# Patient Record
Sex: MALE | Race: BLACK OR AFRICAN AMERICAN | URBAN - METROPOLITAN AREA
[De-identification: names, ages, dates, MRNs, and addresses within clinical notes are randomized per-mention and may not be internally consistent; named-entity substitution may affect disease eponyms.]

---

## 2019-06-13 ENCOUNTER — HOSPITAL ENCOUNTER (OUTPATIENT)
Dept: PHYSICAL THERAPY | Age: 19
Discharge: HOME | End: 2019-06-13
Payer: COMMERCIAL

## 2019-06-13 PROCEDURE — 97161 PT EVAL LOW COMPLEX 20 MIN: CPT

## 2019-06-13 PROCEDURE — 97016 VASOPNEUMATIC DEVICE THERAPY: CPT

## 2019-06-13 PROCEDURE — 97110 THERAPEUTIC EXERCISES: CPT

## 2019-06-13 NOTE — PROGRESS NOTES
PT DAILY TREATMENT NOTE/KNEE EVAL 10-18    Patient Name: Salma Faulkner  Date:2019  : 2000  [x]  Patient  Verified  Payor: BLUE GABY / Plan: Shreya Claros [id number] PPO / Product Type: PPO /    In time:12:35  Out time:1:30  Total Treatment Time (min): 55  Visit #: 1 of 20    Medicare/BCBS Only   Total Timed Codes (min):  55 1:1 Treatment Time:  55     Treatment Area: Right knee pain [M25.561]    SUBJECTIVE  Pain Level (0-10 scale): 3  []constant [x]intermittent []improving []worsening []no change since onset    Any medication changes, allergies to medications, adverse drug reactions, diagnosis change, or new procedure performed?: [x] No    [] Yes (see summary sheet for update)  Subjective functional status/changes:     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 MRI resutls uncertain whether full tear  Current symptoms/Complaints: 4-6 weeks f/u. Pain increase to 9/10 with walking, bending. Achy pain in back of knee and anteriorly. Previous Treatment/Compliance: None  PMHx/Surgical Hx: left knee PCL injury 6 years ago from helmet, asthma   Work Hx: senior year at [employer],   Living Situation: 1 story, 2 GREGG  Pt Goals: \"not to have surgery. \"    OBJECTIVE/EXAMINATION      Modality rationale: decrease edema, decrease inflammation and decrease pain to improve the patients ability to perform daily activities with decreased pain and symptom levels   Min Type Additional Details    [] Estim:  []Unatt       []IFC  []Premod                        []Other:  []w/ice   []w/heat  Position:  Location:    [] Estim: []Att    []TENS instruct  []NMES                    []Other:  []w/US   []w/ice   []w/heat  Position:  Location:    []  Traction: [] Cervical       []Lumbar                       [] Prone          []Supine                       []Intermittent []Continuous Lbs:  [] before manual  [] after manual    []  Ultrasound: []Continuous   [] Pulsed                           []1MHz   []3MHz Location:  W/cm2:    []  Iontophoresis with dexamethasone         Location: [] Take home patch   [] In clinic    []  Ice     []  heat  []  Ice massage  []  Laser   []  Anodyne Position:  Location:    []  Laser with stim  []  Other: Position:  Location:   10 [x]  Vasopneumatic Device Pressure:       [] lo [x] med [] hi   Temperature: [x] lo [] med [] hi   [x] Skin assessment post-treatment:  [x]intact []redness- no adverse reaction    []redness  adverse reaction:     35 min [x]Eval                  []Re-Eval       10 min Therapeutic Exercise:  [x] See flow sheet :   Rationale: increase ROM and increase strength to improve the patients ability to perform daily activities with decreased pain and symptom levels          With   [] TE   [] TA   [] neuro   [] other: Patient Education: [x] Review HEP    [] Progressed/Changed HEP based on:   [] positioning   [] body mechanics   [] transfers   [] heat/ice application    [] other:      Other Objective/Functional Measures: see initial eval     Physical Therapy Evaluation - Knee    Posture: [] Varus    [] Valgus    [] Recurvatum        [] Tibial Torsion    [] Foot Supination    [] Foot Pronation    Describe:    Gait:  [] Normal    [] Abnormal    [x] Antalgic    [] NWB    Device:    Describe:    ROM / Strength  [] Unable to assess                  AROM                      PROM                   Strength (1-5)    Left Right Left Right Left Right   Hip Flexion     5 5    Extension     4 4    Abduction     4- 4-    Adduction         Knee Flexion 135* 130*   5 4    Extension 2* hyper  2* hyper    5 5 , good quad set   Ankle Plantarflexion     30 SL HR 30 SL HR     Dorsiflexion     5 5       Flexibility: [] Unable to assess at this time  Hamstrings:    (L) Tightness= [] WNL   [x] Min   [] Mod   [] Severe    (R) Tightness= [] WNL   [x] Min   [] Mod   [] Severe  Quadriceps:    (L) Tightness= [] WNL   [x] Min   [] Mod   [] Severe    (R) Tightness= [] WNL   [x] Min   [] Mod   [] Severe  Gastroc:      (L) Tightness= [] WNL   [] Min   [] Mod   [] Severe    (R) Tightness= [] WNL   [] Min   [] Mod   [] Severe  Other:    Palpation:   Neg/Pos  Neg/Pos  Neg/Pos   Joint Line  Quad tendon  Patellar ligament    Patella  Fibular head  Pes Anserinus    Tibial tubercle  Hamstring tendons  Infrapatellar fat pad      Optional Tests:  Patellar Positioning (Static)   []L []R Normal []L []R Lateral   []L []R Nancy Mahendra      []L []R Medial   []L []R Baja    Patellar Tracking   []L []R Glide (Lat)   []L []R Tilt (Lat)     []L []R Glide (Med)  []L []R Tilt (Med)      []L []R Tile (Inf)     Patellar Mobility   []L [x]R Hypermobile []L []R Hypomobile         Girth Measurements:     Cm at  Cm above joint line   Cm at   Cm below joint line  Cm at joint line   Left     37   Right      38     Lachmans  [x] Neg    [] Pos Posterior Drawer [] Neg    [x] Pos  Pivot Shift  [] Neg    [] Pos Posterior Sag  [] Neg    [x] Pos  FELICITAS   [] Neg    [] Pos Genie's Test [] Neg    [] Pos  ALRI   [] Neg    [] Pos Squat   [] Neg    [x] Pos  Valgus@ 0 Degrees [x] Neg    [] Pos Cesar [] Neg    [] Pos  Valgus@ 30 Degrees [x] Neg    [] Pos Patellar Apprehension [] Neg    [] Pos  Varus@ 0 Degrees [x] Neg    [] Pos Fagan's Compression [] Neg    [] Pos  Varus@ 30 Degrees [x] Neg    [] Pos Ely's Test  [] Neg    [] Pos  Apley's Compression [] Neg    [] Pos Trisha's Test  [] Neg    [] Pos  Apley's Distraction [] Neg    [] Pos Stroke Test  [] Neg    [] Pos   Anterior Drawer [x] Neg    [] Pos Fluctuation Test [] Neg    [] Pos  Other:                  [] Neg    [] Pos                 Other tests/comments:  Increased instability with right LE SL balance  Increased bilateral laxity with posterior drawer right > left       Pain Level (0-10 scale) post treatment: 0    ASSESSMENT/Changes in Function: 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 Pt reprorting MRI inconclusive on whether full tear of PCL. Pt demonstrates WFL knee ROM, decreased HS, glut and quad strength, poor squat form with increased lumbar paraspinal use and decreased WB on right LE, instability with SL balance on right, and increased laxity with posterior drawer and positive posterior sag. Signs and symptoms consistent with PCL tear. Pt would benefit from skilled PT services to address the above impairments to allow pt to return to playing football without right knee pain. Patient will continue to benefit from skilled PT services to modify and progress therapeutic interventions, address functional mobility deficits, address ROM deficits, address strength deficits, analyze and address soft tissue restrictions, analyze and cue movement patterns, analyze and modify body mechanics/ergonomics, assess and modify postural abnormalities, address imbalance/dizziness and instruct in home and community integration to attain remaining goals. []  See Plan of Care  []  See progress note/recertification  []  See Discharge Summary         Progress towards goals / Updated goals:  Short Term Goals: STG- To be accomplished in 2 week(s):  1. Pt will be independent with HEP to encourage prophylaxis. Eval:HEP dispensed     Long Term Goals: LTG- To be accomplished in 10 week(s):  1. Pt will demonstrate ability to run and participate in football training without knee pain to demonstrate improved functional LE strength. Eval:jogging with minimal pain, unable to run, jump, squat without pain     2. Pt will be able to complete SL squat to parallel with good form and no pain to demonstrate improved functional glut strength.    Eval:DL squat with increased lumbar paraspinal use, decreased WB on right LE, unable to reach parallel without pain    3. Pt right knee AROM flexion will equal that of left without pain to increase ease with stairs  Eval:right 130*, left 135* pain at end range       4. Pt FOTO score will increase by >/=22 points to show improvement in subjective function.   Eval:51  Current: will address at visit 5      PLAN  []  Upgrade activities as tolerated     [x]  Continue plan of care  []  Update interventions per flow sheet       []  Discharge due to:_  []  Other:_      Analilia Carbajal 6/13/2019  12:36 PM

## 2019-06-13 NOTE — PROGRESS NOTES
[facility] at the [address], [place], [address] [facility]  Phone: [phone]      Fax:  [phone]       Plan of Care/ Statement of Necessity for Physical Therapy Services      Patient name: Conrad Bush Start of Care: 2019   Referral source: Savita Boateng MD : 2000    Medical Diagnosis: Right knee pain [M25.561]   Onset Date: 2019    Treatment Diagnosis: right knee pain   Prior Hospitalization: see medical history Provider#: [id number]   Medications: Verified on Patient summary List    Comorbidities: left PCL tear 6 years ago, asthma   Prior Level of Function: football player for       The Plan of Care and following information is based on the information from the initial evaluation. 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 Pt reprorting MRI inconclusive on whether full tear of PCL. Pt demonstrates WFL knee ROM however pain with end range flexion, decreased HS, glut and quad strength, poor squat form with increased lumbar paraspinal use and decreased WB on right LE, instability with SL balance on right, and increased laxity with posterior drawer and positive posterior sag. Signs and symptoms consistent with PCL tear. Pt would benefit from skilled PT services to address the above impairments to allow pt to return to playing football without right knee pain.        Evaluation Complexity History MEDIUM  Complexity : 1-2 comorbidities / personal factors will impact the outcome/ POC ; Examination MEDIUM Complexity : 3 Standardized tests and measures addressing body structure, function, activity limitation and / or participation in recreation  ;Presentation LOW Complexity : Stable, uncomplicated  ;Clinical Decision Making MEDIUM Complexity : FOTO score of 26-74  Overall Complexity Rating: LOW   Problem List: pain affecting function, decrease ROM, decrease strength, edema affecting function, impaired gait/ balance, decrease ADL/ functional abilitiies, decrease activity tolerance, decrease flexibility/ joint mobility and decrease transfer abilities   Treatment Plan may include any combination of the following: Therapeutic exercise, Therapeutic activities, Neuromuscular re-education, Physical agent/modality, Gait/balance training, Manual therapy, Patient education, Self Care training, Functional mobility training and Home safety training  Patient / Family readiness to learn indicated by: asking questions, trying to perform skills and interest  Persons(s) to be included in education: patient (P)  Barriers to Learning/Limitations: None  Patient Goal (s): not to have surgery. \"  Patient Self Reported Health Status: excellent  Rehabilitation Potential: excellent    Short Term Goals: STG- To be accomplished in 2 week(s):  1. Pt will be independent with HEP to encourage prophylaxis. Eval:HEP dispensed      Long Term Goals: LTG- To be accomplished in 10 week(s):  1. Pt will demonstrate ability to run and participate in football training without knee pain to demonstrate improved functional LE strength. Eval:jogging with minimal pain, unable to run, jump, squat without pain      2. Pt will be able to complete SL squat to parallel with good form and no pain to demonstrate improved functional glut strength. Eval:DL squat with increased lumbar paraspinal use, decreased WB on right LE, unable to reach parallel without pain     3. Pt right knee AROM flexion will equal that of left without pain to increase ease with stairs  Eval:right 130*, left 135* pain at end range         4. Pt FOTO score will increase by >/=22 points to show improvement in subjective function. Eval:51  Current: will address at visit 5           Frequency / Duration: Patient to be seen 2 times per week for 10 weeks.     Patient/ Caregiver education and instruction: Diagnosis, prognosis, self care, activity modification and exercises   [x]  Plan of care has been reviewed with JI IRELAND Remesic 6/13/2019 3:07 PM  _____________________________________________________________________  I certify that the above Therapy Services are being furnished while the patient is under my care. I agree with the treatment plan and certify that this therapy is necessary.     Physician's Signature:____________Date:_________TIME:________    [employer], Date and Time must be completed for valid certification **    Please sign and return to [facility] at the [address], [place], [address] [facility]       Phone: [phone]      Fax:  [phone]

## 2019-06-25 ENCOUNTER — HOSPITAL ENCOUNTER (OUTPATIENT)
Dept: PHYSICAL THERAPY | Age: 19
Discharge: HOME | End: 2019-06-25
Payer: COMMERCIAL

## 2019-06-25 PROCEDURE — 97016 VASOPNEUMATIC DEVICE THERAPY: CPT

## 2019-06-25 PROCEDURE — 97110 THERAPEUTIC EXERCISES: CPT

## 2019-06-25 NOTE — PROGRESS NOTES
PT DAILY TREATMENT NOTE    Patient Name: America Fontana  Date:2019  : 2000  [x]  Patient  Verified  Payor: BLUE CROSS / Plan: Shreya Claros [id number] PPO / Product Type: PPO /    In time:12:20  Out time:1:27  Total Treatment Time (min): 67  Total Timed Codes (min): 57  1:1 Treatment Time (MC only): 50   Visit #: 2 of 20    Treatment Area: Right knee pain [M25.561]    SUBJECTIVE  Pain Level (0-10 scale): 0  Any medication changes, allergies to medications, adverse drug reactions, diagnosis change, or new procedure performed?: [x] No    [] Yes (see summary sheet for update)  Subjective functional status/changes:   [] No changes reported  \"Its fine. \"     OBJECTIVE    Modalities Rationale:     decrease inflammation, decrease pain and increase tissue extensibility to improve patient's ability to perform pain free ADL's    min [] Estim, type/location:                                      []  att     []  unatt     []  w/US     []  w/ice    []  w/heat    min []  Mechanical Traction: type/lbs                   []  pro   []  sup   []  int   []  cont    []  before manual    []  after manual    min []  Ultrasound, settings/location:      min []  Iontophoresis w/ dexamethasone, location:                                               []  take home patch       []  in clinic    min []  Ice     []  Heat    location/position:    10 min [x]  Vasopneumatic Device, press/temp:  To right knee in supine with elevation     min []  Other:    [x] Skin assessment post-treatment (if applicable):    []  intact    []  redness- no adverse reaction     []redness  adverse reaction:          57 min Therapeutic Exercise:  [x] See flow sheet :   Rationale: increase ROM, increase strength, improve coordination and improve balance to improve the patients ability to perform pain free ADL's     With   [x] TE   [] TA   [] neuro   [] other: Patient Education: [x] Review HEP    [] Progressed/Changed HEP based on:   [] positioning [] body mechanics   [] transfers   [] heat/ice application    [] other:      Other Objective/Functional Measures:   Good performance of therex   Challenged with balance on foam  Patella femoral pain with step ups on level 4, decreased pain with level 3      Pain Level (0-10 scale) post treatment: 0    ASSESSMENT/Changes in Function: Pt reported no pain  At start of treatment. Tolerated all therex very well. Slight challenge with balance on foam. Reported most pain with squatting and kneeling. Challenged with stool scoots and 90/90     Patient will continue to benefit from skilled PT services to modify and progress therapeutic interventions, address functional mobility deficits, address ROM deficits, address strength deficits, analyze and address soft tissue restrictions, analyze and cue movement patterns, analyze and modify body mechanics/ergonomics, assess and modify postural abnormalities, address imbalance/dizziness and instruct in home and community integration to attain remaining goals. []  See Plan of Care  []  See progress note/recertification  []  See Discharge Summary         Progress towards goals / Updated goals:  Short Term Goals: STG- To be accomplished in 2 week(s):  1. Pt will be independent with HEP to encourage prophylaxis. Eval:HEP dispensed      Long Term Goals: LTG- To be accomplished in 10 week(s):  1. Pt will demonstrate ability to run and participate in football training without knee pain to demonstrate improved functional LE strength. Eval:jogging with minimal pain, unable to run, jump, squat without pain      2. Pt will be able to complete SL squat to parallel with good form and no pain to demonstrate improved functional glut strength. Eval:DL squat with increased lumbar paraspinal use, decreased WB on right LE, unable to reach parallel without pain     3.   Pt right knee AROM flexion will equal that of left without pain to increase ease with stairs  Eval:right 130*, left 135* pain at end range   Current: NA      4. Pt FOTO score will increase by >/=22 points to show improvement in subjective function. Eval:51  Current: will address at visit 5           PLAN  [x]  Upgrade activities as tolerated     [x]  Continue plan of care  []  Update interventions per flow sheet       []  Discharge due to:_  []  Other:_      Minus Sonali, PTA 6/25/2019  12:26 PM    No future appointments.

## 2019-06-26 ENCOUNTER — APPOINTMENT (OUTPATIENT)
Dept: PHYSICAL THERAPY | Age: 19
End: 2019-06-26
Payer: COMMERCIAL

## 2019-07-01 ENCOUNTER — HOSPITAL ENCOUNTER (OUTPATIENT)
Dept: PHYSICAL THERAPY | Age: 19
Discharge: HOME | End: 2019-07-01
Payer: COMMERCIAL

## 2019-07-01 PROCEDURE — 97110 THERAPEUTIC EXERCISES: CPT

## 2019-07-01 PROCEDURE — 97016 VASOPNEUMATIC DEVICE THERAPY: CPT

## 2019-07-01 NOTE — PROGRESS NOTES
PT DAILY TREATMENT NOTE    Patient Name: Stephan Jason  Date:2019  : 2000  [x]  Patient  Verified  Payor: BLUE GABY / Plan: Shreya Claros [id number] PPO / Product Type: PPO /    In time:3:15  Out time:4:20  Total Treatment Time (min): 65  Total Timed Codes (min): 55  1:1 Treatment Time (MC only): 54   Visit #: 3 of 20    Treatment Area: Right knee pain [M25.561]    SUBJECTIVE  Pain Level (0-10 scale): 0  Any medication changes, allergies to medications, adverse drug reactions, diagnosis change, or new procedure performed?: [x] No    [] Yes (see summary sheet for update)  Subjective functional status/changes:   [] No changes reported  \"good. \"     OBJECTIVE    Modalities Rationale:     decrease pain to improve patient's ability to perform pain free ADL's    min [] Estim, type/location:                                      []  att     []  unatt     []  w/US     []  w/ice    []  w/heat    min []  Mechanical Traction: type/lbs                   []  pro   []  sup   []  int   []  cont    []  before manual    []  after manual    min []  Ultrasound, settings/location:      min []  Iontophoresis w/ dexamethasone, location:                                               []  take home patch       []  in clinic    min []  Ice     []  Heat    location/position:    10 min [x]  Vasopneumatic Device, press/temp:  To right knee in supine     min []  Other:    [] Skin assessment post-treatment (if applicable):    []  intact    []  redness- no adverse reaction     []redness  adverse reaction:          55 min Therapeutic Exercise:  [] See flow sheet :   Rationale: increase ROM, increase strength, improve coordination and improve balance to improve the patients ability to perform pain free ADL's     With   [x] TE   [] TA   [] neuro   [] other: Patient Education: [x] Review HEP    [] Progressed/Changed HEP based on:   [] positioning   [] body mechanics   [] transfers   [] heat/ice application    [] other: Other Objective/Functional Measures:   Pain with heel downs      Pain Level (0-10 scale) post treatment: 0    ASSESSMENT/Changes in Function: Pt tolerated all therex well. reported no pain after last session. Pt reported pain in knee with heel downs. Good response to stool scoots with no pain . Patient will continue to benefit from skilled PT services to modify and progress therapeutic interventions, address functional mobility deficits, address ROM deficits, address strength deficits, analyze and address soft tissue restrictions, analyze and cue movement patterns, analyze and modify body mechanics/ergonomics, assess and modify postural abnormalities, address imbalance/dizziness and instruct in home and community integration to attain remaining goals. []  See Plan of Care  []  See progress note/recertification  []  See Discharge Summary         Progress towards goals / Updated goals:  Short Term Goals: STG- To be accomplished in 2 week(s):  1.  Pt will be independent with HEP to encourage prophylaxis. Eval:HEP dispensed      Long Term Goals: LTG- To be accomplished in 10 week(s):  1.  Pt will demonstrate ability to run and participate in football training without knee pain to demonstrate improved functional LE strength. Eval:jogging with minimal pain, unable to run, jump, squat without pain   Current: not running at this time. Pain with heel down      2.  Pt will be able to complete SL squat to parallel with good form and no pain to demonstrate improved functional glut strength.   Eval:DL squat with increased lumbar paraspinal use, decreased WB on right LE, unable to reach parallel without pain     3.  Pt right knee AROM flexion will equal that of left without pain to increase ease with stairs  Eval:right 130*, left 135* pain at end range   Current: NA      4.  Pt FOTO score will increase by >/=22 points to show improvement in subjective function.   Eval:51  Current: will address at visit 5           PLAN  [x]  Upgrade activities as tolerated     [x]  Continue plan of care  []  Update interventions per flow sheet       []  Discharge due to:_  []  Other:_      Alyssa Pickett PTA 7/1/2019  3:23 PM    Future Appointments   Date Time Provider [place]   7/2/2019 12:15 PM Ulysses Eden PTA [id number]

## 2019-07-02 ENCOUNTER — HOSPITAL ENCOUNTER (OUTPATIENT)
Dept: PHYSICAL THERAPY | Age: 19
Discharge: HOME | End: 2019-07-02
Payer: COMMERCIAL

## 2019-07-02 PROCEDURE — 97110 THERAPEUTIC EXERCISES: CPT

## 2019-07-02 PROCEDURE — 97016 VASOPNEUMATIC DEVICE THERAPY: CPT

## 2019-07-02 NOTE — PROGRESS NOTES
PT DAILY TREATMENT NOTE    Patient Name: Manuel Mcginnis  Date:2019  : 2000  [x]  Patient  Verified  Payor: BLUE CROSS / Plan: Shreya Claros [id number] PPO / Product Type: PPO /    In time:12:25  Out time:1:30  Total Treatment Time (min): 65  Total Timed Codes (min): 55  1:1 Treatment Time (MC only): 55   Visit #: 4 of 20    Treatment Area: Right knee pain [M25.561]    SUBJECTIVE  Pain Level (0-10 scale): 0  Any medication changes, allergies to medications, adverse drug reactions, diagnosis change, or new procedure performed?: [x] No    [] Yes (see summary sheet for update)  Subjective functional status/changes:   [] No changes reported  \"Im a little sore. \"     OBJECTIVE    Modalities Rationale:     decrease inflammation and decrease pain to improve patient's ability to perform pain free ADL's    min [] Estim, type/location:                                      []  att     []  unatt     []  w/US     []  w/ice    []  w/heat    min []  Mechanical Traction: type/lbs                   []  pro   []  sup   []  int   []  cont    []  before manual    []  after manual    min []  Ultrasound, settings/location:      min []  Iontophoresis w/ dexamethasone, location:                                               []  take home patch       []  in clinic    min []  Ice     []  Heat    location/position:    10 min [x]  Vasopneumatic Device, press/temp:  To right knee in supine with elevation     min []  Other:    [x] Skin assessment post-treatment (if applicable):    []  intact    []  redness- no adverse reaction     []redness  adverse reaction:        55 min Therapeutic Exercise:  [] See flow sheet :   Rationale: increase ROM, increase strength, improve coordination and improve balance to improve the patients ability to perform pain free ADL\"s     With   [x] TE   [] TA   [] neuro   [] other: Patient Education: [x] Review HEP    [] Progressed/Changed HEP based on:   [] positioning   [] body mechanics   [] transfers   [] heat/ice application    [] other:      Other Objective/Functional Measures:   Challenged with heel downs   Pain in anterior knee with lateral step ups      Pain Level (0-10 scale) post treatment: 0    ASSESSMENT/Changes in Function: Pt reported good tolerance to all exercises. Mild pain reported in anterior knee with lateral step ups and heel downs. No pain with TKE. Patient will continue to benefit from skilled PT services to modify and progress therapeutic interventions, address functional mobility deficits, address ROM deficits, address strength deficits, analyze and address soft tissue restrictions, analyze and cue movement patterns, analyze and modify body mechanics/ergonomics, assess and modify postural abnormalities, address imbalance/dizziness and instruct in home and community integration to attain remaining goals. []  See Plan of Care  []  See progress note/recertification  []  See Discharge Summary         Progress towards goals / Updated goals:  Short Term Goals: STG- To be accomplished in 2 week(s):  1.  Pt will be independent with HEP to encourage prophylaxis. Eval:HEP dispensed      Long Term Goals: LTG- To be accomplished in 10 week(s):  1.  Pt will demonstrate ability to run and participate in football training without knee pain to demonstrate improved functional LE strength. Eval:jogging with minimal pain, unable to run, jump, squat without pain   Current: not running at this time.  Pain with heel down      2.  Pt will be able to complete SL squat to parallel with good form and no pain to demonstrate improved functional glut strength.   Eval:DL squat with increased lumbar paraspinal use, decreased WB on right LE, unable to reach parallel without pain     3.  Pt right knee AROM flexion will equal that of left without pain to increase ease with stairs  Eval:right 130*, left 135* pain at end range   Current: NA      4.  Pt FOTO score will increase by >/=22 points to show improvement in subjective function.   Eval:51  Current: will address at visit 5              PLAN  [x]  Upgrade activities as tolerated     [x]  Continue plan of care  []  Update interventions per flow sheet       []  Discharge due to:_  []  Other:_      Mela Fleming, PTA 7/2/2019  12:27 PM    Future Appointments   Date Time Provider [place]   7/9/2019  3:00 PM Alexandria Burr, PT [facility]   7/12/2019 10:00 AM RemesicSharee Keyuridias [facility]   7/15/2019  5:30 PM Remesic Veropeterson Kemps [facility]   7/17/2019  3:45 PM Remesic, Veronia Kemps [facility]   7/22/2019  4:00 PM Remesic Veronia Kemps [facility]   7/24/2019  5:15 PM Remesic Veronia Kemps [facility]   7/29/2019  4:45 PM Remesic Veronia Kemps [facility]   7/31/2019  3:45 PM Remesic, Veronia Kemps [facility]

## 2019-07-09 ENCOUNTER — HOSPITAL ENCOUNTER (OUTPATIENT)
Dept: PHYSICAL THERAPY | Age: 19
Discharge: HOME | End: 2019-07-09
Payer: COMMERCIAL

## 2019-07-09 PROCEDURE — 97016 VASOPNEUMATIC DEVICE THERAPY: CPT

## 2019-07-09 PROCEDURE — 97110 THERAPEUTIC EXERCISES: CPT

## 2019-07-09 NOTE — PROGRESS NOTES
PT DAILY TREATMENT NOTE    Patient Name: Inés Santana  Date:2019  : 2000  [x]  Patient  Verified  Payor: BLUE CROSS / Plan: Shreya Claros [id number] PPO / Product Type: PPO /    In time:3:00  Out time:4:10  Total Treatment Time (min): 70  Total Timed Codes (min): 60  1:1 Treatment Time (MC only): 60   Visit #: 5 of 20    Treatment Area: Right knee pain [M25.561]    SUBJECTIVE  Pain Level (0-10 scale): 3  Any medication changes, allergies to medications, adverse drug reactions, diagnosis change, or new procedure performed?: [x] No    [] Yes (see summary sheet for update)  Subjective functional status/changes:   [] No changes reported  \"Im having a little bit of pain in the front of the knee with walking, but that its. \"     OBJECTIVE    Modalities Rationale:     decrease pain to improve patient's ability to perform pain free ADL's    min [] Estim, type/location:                                      []  att     []  unatt     []  w/US     []  w/ice    []  w/heat    min []  Mechanical Traction: type/lbs                   []  pro   []  sup   []  int   []  cont    []  before manual    []  after manual    min []  Ultrasound, settings/location:      min []  Iontophoresis w/ dexamethasone, location:                                               []  take home patch       []  in clinic    min []  Ice     []  Heat    location/position:    10 min [x]  Vasopneumatic Device, press/temp: To left knee in supine .     min []  Other:    [] Skin assessment post-treatment (if applicable):    []  intact    []  redness- no adverse reaction     []redness  adverse reaction:          60 min Therapeutic Exercise:  [x] See flow sheet :   Rationale: increase ROM, increase strength, improve coordination and improve balance to improve the patients ability to perform pain free ADL\"s           With   [x] TE   [] TA   [] neuro   [] other: Patient Education: [x] Review HEP    [] Progressed/Changed HEP based on:   [] positioning   [] body mechanics   [] transfers   [] heat/ice application    [] other:      Other Objective/Functional Measures:   Glut fatigue with hip hikes and hip abd  Pain with heel downs    Knee flexion : NA  Single squat to 24 inch surface with minimal pain (unable to squat lower due to pain)       Pain Level (0-10 scale) post treatment: 0    ASSESSMENT/Changes in Function: Pt reported anterior knee pain with ambulation. Continues to demonstrated weak hamstrings / gluts. Pt reports knee feels the best after stool scoots and hamstring exercise. Patient will continue to benefit from skilled PT services to modify and progress therapeutic interventions, address functional mobility deficits, address ROM deficits, address strength deficits, analyze and address soft tissue restrictions, analyze and cue movement patterns, analyze and modify body mechanics/ergonomics, assess and modify postural abnormalities, address imbalance/dizziness and instruct in home and community integration to attain remaining goals. []  See Plan of Care  []  See progress note/recertification  []  See Discharge Summary         Progress towards goals / Updated goals:  Short Term Goals: STG- To be accomplished in 2 week(s):  1.  Pt will be independent with HEP to encourage prophylaxis. Eval:HEP dispensed  Current: reviewed  And complaint      Long Term Goals: LTG- To be accomplished in 10 week(s):  1.  Pt will demonstrate ability to run and participate in football training without knee pain to demonstrate improved functional LE strength. Eval:jogging with minimal pain, unable to run, jump, squat without pain   Current: not running at this time.  Pain with heel down and single leg squat at session 5      2.  Pt will be able to complete SL squat to parallel with good form and no pain to demonstrate improved functional glut strength.   Eval:DL squat with increased lumbar paraspinal use, decreased WB on right LE, unable to reach parallel without pain  Current: Pain with single leg squat to 24 inch surface      3.  Pt right knee AROM flexion will equal that of left without pain to increase ease with stairs  Eval:right 130*, left 135* pain at end range   Current:  NA      4.  Pt FOTO score will increase by >/=22 points to show improvement in subjective function.   Eval:51  Current: 77                 PLAN  [x]  Upgrade activities as tolerated     [x]  Continue plan of care  []  Update interventions per flow sheet       []  Discharge due to:_  []  Other:_      Kentrell Wade, JI 7/9/2019  3:04 PM    Future Appointments   Date Time Provider [place] Land   7/12/2019 10:00 AM Digna Liu [facility]   7/15/2019  5:30 PM Digna Liu [facility]   7/17/2019  3:45 PM Digna Liu [facility]   7/22/2019  4:00 PM Digna Liu [facility]   7/24/2019  5:15 [facility]   7/29/2019  4:45 PM Digna Liu [facility]   7/31/2019  3:45 PM Digna Liu [facility]

## 2019-07-12 ENCOUNTER — HOSPITAL ENCOUNTER (OUTPATIENT)
Dept: PHYSICAL THERAPY | Age: 19
End: 2019-07-12
Payer: COMMERCIAL

## 2019-07-15 ENCOUNTER — HOSPITAL ENCOUNTER (OUTPATIENT)
Dept: PHYSICAL THERAPY | Age: 19
Discharge: HOME | End: 2019-07-15
Payer: COMMERCIAL

## 2019-07-15 PROCEDURE — 97110 THERAPEUTIC EXERCISES: CPT

## 2019-07-15 PROCEDURE — 97530 THERAPEUTIC ACTIVITIES: CPT

## 2019-07-15 PROCEDURE — 97016 VASOPNEUMATIC DEVICE THERAPY: CPT

## 2019-07-15 NOTE — PROGRESS NOTES
PT DAILY TREATMENT NOTE    Patient Name: Alexandria Chau  Date:7/15/2019  : 2000  [x]  Patient  Verified  Payor: BLUE GABY / Plan: Shreya Claros [id number] PPO / Product Type: PPO /    In time:5:35  Out time: 7:00  Total Treatment Time (min): 85  Total Timed Codes (min): 85  1:1 Treatment Time ( only): 60   Visit #: 6 of 20    Treatment Area: Right knee pain [M25.561]    SUBJECTIVE  Pain Level (0-10 scale): 0  Any medication changes, allergies to medications, adverse drug reactions, diagnosis change, or new procedure performed?: [x] No    [] Yes (see summary sheet for update)  Subjective functional status/changes:   [] No changes reported  \"Still pain with going down stairs. \"    OBJECTIVE    Modalities Rationale:     decrease edema, decrease inflammation and decrease pain to improve patient's ability to perform daily activities with decreased pain and symptom levels   min [] Estim, type/location:                                      []  att     []  unatt     []  w/US     []  w/ice    []  w/heat    min []  Mechanical Traction: type/lbs                   []  pro   []  sup   []  int   []  cont    []  before manual    []  after manual    min []  Ultrasound, settings/location:      min []  Iontophoresis w/ dexamethasone, location:                                               []  take home patch       []  in clinic    min []  Ice     []  Heat    location/position:    10 min [x]  Vasopneumatic Device, press/temp: Supine, right knee    min []  Other:    [x] Skin assessment post-treatment (if applicable):    [x]  intact    []  redness- no adverse reaction     []redness  adverse reaction:          55 min Therapeutic Exercise:  [x] See flow sheet :   Rationale: increase ROM and increase strength to improve the patients ability to perform daily activities with decreased pain and symptom levels    20 min Therapeutic Activity:  [x]  See flow sheet :   Rationale: improve coordination, improve balance and increase proprioception  to improve the patients ability to perform daily activities with decreased pain and symptom levels           With   [] TE   [] TA   [] neuro   [] other: Patient Education: [x] Review HEP    [] Progressed/Changed HEP based on:   [] positioning   [] body mechanics   [] transfers   [] heat/ice application    [] other:      Other Objective/Functional Measures: see updated goals below      Pain Level (0-10 scale) post treatment: 0    ASSESSMENT/Changes in Function: Pt presented to therapy with c/c right anterior and posterior knee pain after PCL injury in early spring from landing on right knee flexed. Pt has attended 6 sessions focusing on improving ROM, strength, balance, and squat mechancis with pt making good progress towards goals. Right knee ROM now [facility] however pain still at end range. Max pain 2/10 now in anterior knee with descending stairs and SL squat possibly due to decreased functional glut strength. Will iniate ladder and running drills when able to complete SL squat to parallel without knee pain. Pt would benefit from continued skilled PT services to address remaining unmet goals to be able to participate in football practice next month. Patient will continue to benefit from skilled PT services to modify and progress therapeutic interventions, address functional mobility deficits, address ROM deficits, address strength deficits, analyze and cue movement patterns, analyze and modify body mechanics/ergonomics, assess and modify postural abnormalities and instruct in home and community integration to attain remaining goals. []  See Plan of Care  []  See progress note/recertification  []  See Discharge Summary         Progress towards goals / Updated goals:  Short Term Goals: STG- To be accomplished in 2 week(s):  1.  Pt will be independent with HEP to encourage prophylaxis.   Eval:HEP dispensed  Current: reviewed  And complaint goal MET     Long Term Goals: LTG- To be accomplished in 10 week(s):  1.  Pt will demonstrate ability to run and participate in football training without knee pain to demonstrate improved functional LE strength. Eval:jogging with minimal pain, unable to run, jump, squat without pain   Current: not running at this time. Pain with heel down and single leg squat progressing      2.  Pt will be able to complete SL squat to parallel with good form and no pain to demonstrate improved functional glut strength.   Eval:DL squat with increased lumbar paraspinal use, decreased WB on right LE, unable to reach parallel without pain  Current: Pain with single leg squat to 24 inch surface progressing      3.  Pt right knee AROM flexion will equal that of left without pain to increase ease with stairs  Eval:right 130*, left 135* pain at end range   Current:  140* right knee flexion, 2/10 pain progressing      4.  Pt FOTO score will increase by >/=22 points to show improvement in subjective function.   Eval:51  Current: 66 progressing            PLAN  [x]  Upgrade activities as tolerated     [x]  Continue plan of care  []  Update interventions per flow sheet       []  Discharge due to:_  []  Other:_      Sapphire Liu 7/15/2019  5:36 PM    Future Appointments   Date Time Provider [place]ina Land   7/17/2019  3:45 PM Sapphire Liu [facility]   7/22/2019  4:00 PM Sapphire Liu [facility]   7/24/2019  5:15 PM Sapphire Liu [facility]   7/29/2019  4:45 PM Sapphire Liu [facility]   7/31/2019  3:45 PM Sapphire Liu [facility]

## 2019-07-15 NOTE — PROGRESS NOTES
[facility] at the [address], [place], [address] [facility]  Phone: [phone]      Fax:  [phone]    Progress Note  Patient name: Inés Santana Start of Care: 19   Referral source: Nany Chandra MD : 2000   Medical/Treatment Diagnosis: Right knee pain [M25.561] Onset Date:2019     Prior Hospitalization: see medical history Provider#: [id number]   Medications: Verified on Patient Summary List    Comorbidities: left PCL tear 6 years ago, asthma  Prior Level of Function:football player for [employer]                             Visits from Start of Care: 6    Missed Visits: 2    Short Term Goals: STG- To be accomplished in 2 week(s):  1.  Pt will be independent with HEP to encourage prophylaxis. Eval:HEP dispensed  Current: reviewed  And complaint goal MET     Long Term Goals: LTG- To be accomplished in 10 week(s):  1.  Pt will demonstrate ability to run and participate in football training without knee pain to demonstrate improved functional LE strength. Eval:jogging with minimal pain, unable to run, jump, squat without pain   Current: not running at this time. Pain with heel down and single leg squat progressing      2.  Pt will be able to complete SL squat to parallel with good form and no pain to demonstrate improved functional glut strength.   Eval:DL squat with increased lumbar paraspinal use, decreased WB on right LE, unable to reach parallel without pain  Current: Pain with single leg squat to 24 inch surface progressing      3.  Pt right knee AROM flexion will equal that of left without pain to increase ease with stairs  Eval:right 130*, left 135* pain at end range   Current:  140* right knee flexion, 2/10 pain progressing      4.  Pt FOTO score will increase by >/=22 points to show improvement in subjective function.   Eval:51  Current: 66 progressing           Key Functional Changes: Pt presented to therapy with c/c right anterior and posterior knee pain after PCL injury in early spring from landing on right knee flexed. Pt has attended 6 sessions focusing on improving ROM, strength, balance, and squat mechancis with pt making good progress towards goals. Right knee ROM now [facility] however pain still at end range. Max pain 2/10 now in anterior knee with descending stairs and SL squat possibly due to decreased functional glut strength. Will iniate ladder and running drills when able to complete SL squat to parallel without knee pain. Pt would benefit from continued skilled PT services to address remaining unmet goals to be able to participate in football practice next month. Updated Goals: to be achieved in 4 weeks:   See unmet above    ASSESSMENT/RECOMMENDATIONS:  [x]Continue therapy per initial plan/protocol at a frequency of  2 x per week for 4 weeks  []Continue therapy with the following recommended changes:_____________________      _____________________________________________________________________  []Discontinue therapy progressing towards or have reached established goals  []Discontinue therapy due to lack of appreciable progress towards goals  []Discontinue therapy due to lack of attendance or compliance  []Await Physician's recommendations/decisions regarding therapy  []Other:________________________________________________________________    Thank you for this referral.   Sherren Miyamoto Remesi 7/15/2019 5:42 PM  NOTE TO PHYSICIAN:  [address]   FAX TO [facility]: [phone]  If you are unable to process this request in 24 hours please contact our office: [phone]        []  I have read the above report and request that my patient continue as recommended.   []  I have read the above report and request that my patient continue therapy with the following changes/special instructions:________________________________________  []I have read the above report and request that my patient be discharged from therapy.     [de-identified] Signature:____________Date:_________TIME:________    [facility], Date and Time must be completed for valid certification **

## 2019-07-17 ENCOUNTER — HOSPITAL ENCOUNTER (OUTPATIENT)
Dept: PHYSICAL THERAPY | Age: 19
Discharge: HOME | End: 2019-07-17
Payer: COMMERCIAL

## 2019-07-17 PROCEDURE — 97530 THERAPEUTIC ACTIVITIES: CPT

## 2019-07-17 PROCEDURE — 97110 THERAPEUTIC EXERCISES: CPT

## 2019-07-17 PROCEDURE — 97016 VASOPNEUMATIC DEVICE THERAPY: CPT

## 2019-07-17 NOTE — PROGRESS NOTES
PT DAILY TREATMENT NOTE    Patient Name: Lorraine Roberts  Date:2019  : 2000  [x]  Patient  Verified  Payor: BLUE CROSS / Plan: Shreya Claros [id number] PPO / Product Type: PPO /    In time:3:13  Out time:4:35  Total Treatment Time (min): 82  Total Timed Codes (min): 82  1:1 Treatment Time ( only): 54   Visit #: 7 of 20    Treatment Area: Right knee pain [M25.561]    SUBJECTIVE  Pain Level (0-10 scale): 0  Any medication changes, allergies to medications, adverse drug reactions, diagnosis change, or new procedure performed?: [x] No    [] Yes (see summary sheet for update)  Subjective functional status/changes:   [] No changes reported  \"I'm sore from last time. \"    OBJECTIVE    Modalities Rationale:     decrease edema, decrease inflammation and decrease pain to improve patient's ability to perform daily activities with decreased pain and symptom levels   min [] Estim, type/location:                                      []  att     []  unatt     []  w/US     []  w/ice    []  w/heat    min []  Mechanical Traction: type/lbs                   []  pro   []  sup   []  int   []  cont    []  before manual    []  after manual    min []  Ultrasound, settings/location:      min []  Iontophoresis w/ dexamethasone, location:                                               []  take home patch       []  in clinic    min []  Ice     []  Heat    location/position:    10 min [x]  Vasopneumatic Device, press/temp: Supine, right knee     min []  Other:    [x] Skin assessment post-treatment (if applicable):    [x]  intact    []  redness- no adverse reaction     []redness  adverse reaction:          52 min Therapeutic Exercise:  [x] See flow sheet :   Rationale: increase ROM and increase strength to improve the patients ability to perform daily activities with decreased pain and symptom levels     20 min Therapeutic Activity:  [x]  See flow sheet :   Rationale: improve coordination, improve balance and increase proprioception  to improve the patients ability to perform daily activities with decreased pain and symptom levels           With   [] TE   [] TA   [] neuro   [] other: Patient Education: [x] Review HEP    [] Progressed/Changed HEP based on:   [] positioning   [] body mechanics   [] transfers   [] heat/ice application    [] other:      Other Objective/Functional Measures:   Visible fatigue with eccentric on leg press     Pain Level (0-10 scale) post treatment: 0    ASSESSMENT/Changes in Function: Good tolerance to exercises with ability to decrease knee pain with VC for proper form. Able to complete goblet squat without pain. Pt started swimming today with no reports of knee pain. Plan to initiate  ladder drills next week. Patient will continue to benefit from skilled PT services to modify and progress therapeutic interventions, address functional mobility deficits, address ROM deficits, address strength deficits, analyze and cue movement patterns, analyze and modify body mechanics/ergonomics, assess and modify postural abnormalities and instruct in home and community integration to attain remaining goals. []  See Plan of Care  []  See progress note/recertification  []  See Discharge Summary         Progress towards goals / Updated goals:  Long Term Goals: LTG- To be accomplished in 10 week(s):  1.  Pt will demonstrate ability to run and participate in football training without knee pain to demonstrate improved functional LE strength. Eval:jogging with minimal pain, unable to run, jump, squat without pain   LAst PN: not running at this time.  Pain with heel down and single leg squat   Current:  plan to initiate ladder drills next week      2.  Pt will be able to complete SL squat to parallel with good form and no pain to demonstrate improved functional glut strength.   Eval:DL squat with increased lumbar paraspinal use, decreased WB on right LE, unable to reach parallel without pain  Last PN:  Pain with single leg squat to 24 inch surface  Current:      3.  Pt right knee AROM flexion will equal that of left without pain to increase ease with stairs  Eval:right 130*, left 135* pain at end range   Last PN  140* right knee flexion, 2/10 pain   Current:       4.  Pt FOTO score will increase by >/=22 points to show improvement in subjective function.   Eval:51  LAst PN: 66   Current: will reassess visit 10       PLAN  [x]  Upgrade activities as tolerated     [x]  Continue plan of care  []  Update interventions per flow sheet       []  Discharge due to:_  []  Other:_      Sapphire Liu 7/17/2019  3:18 PM    Future Appointments   Date Time Provider [place]   7/17/2019  3:45 PM Sapphire Liu [facility]   7/22/2019  4:00 PM Sapphire Liu [facility]   7/24/2019  5:15 PM Sapphire Liu [facility]   7/29/2019  4:45 PM Sapphire Liu [facility]   7/31/2019  3:45 PM Sapphire Liu [facility]

## 2019-07-24 ENCOUNTER — HOSPITAL ENCOUNTER (OUTPATIENT)
Dept: PHYSICAL THERAPY | Age: 19
Discharge: HOME | End: 2019-07-24
Payer: COMMERCIAL

## 2019-07-24 PROCEDURE — 97530 THERAPEUTIC ACTIVITIES: CPT

## 2019-07-24 PROCEDURE — 97016 VASOPNEUMATIC DEVICE THERAPY: CPT

## 2019-07-24 PROCEDURE — 97110 THERAPEUTIC EXERCISES: CPT

## 2019-07-24 NOTE — PROGRESS NOTES
PT DAILY TREATMENT NOTE    Patient Name: China Galeas  Date:2019  : 2000  [x]  Patient  Verified  Payor: BLUE CROSS / Plan: Shreya Claros [id number] PPO / Product Type: PPO /    In time:5:10  Out time:6:20  Total Treatment Time (min): 70  Total Timed Codes (min): 70  1:1 Treatment Time ( only): 54   Visit #: 8 of 20    Treatment Area: Right knee pain [M25.561]    SUBJECTIVE  Pain Level (0-10 scale): 0  Any medication changes, allergies to medications, adverse drug reactions, diagnosis change, or new procedure performed?: [x] No    [] Yes (see summary sheet for update)  Subjective functional status/changes:   [] No changes reported  \"I started jogging a little bit and it didn;t hurt. \"    OBJECTIVE    Modalities Rationale:     decrease edema, decrease inflammation and decrease pain to improve patient's ability to perform daily activities with decreased pain and symptom levels   min [] Estim, type/location:                                      []  att     []  unatt     []  w/US     []  w/ice    []  w/heat    min []  Mechanical Traction: type/lbs                   []  pro   []  sup   []  int   []  cont    []  before manual    []  after manual    min []  Ultrasound, settings/location:      min []  Iontophoresis w/ dexamethasone, location:                                               []  take home patch       []  in clinic    min []  Ice     []  Heat    location/position:    10 min [x]  Vasopneumatic Device, press/temp: supine    min []  Other:    [x] Skin assessment post-treatment (if applicable):    [x]  intact    []  redness- no adverse reaction     []redness  adverse reaction:            40 min Therapeutic Exercise:  [x] See flow sheet :   Rationale: increase ROM and increase strength to improve the patients ability to perform daily activities with decreased pain and symptom levels    20 min Therapeutic Activity:  []  See flow sheet :   Rationale: increase strength, improve coordination, improve balance and increase proprioception  to improve the patients ability to perform daily activities with decreased pain and symptom levels           With   [] TE   [] TA   [] neuro   [] other: Patient Education: [x] Review HEP    [] Progressed/Changed HEP based on:   [] positioning   [] body mechanics   [] transfers   [] heat/ice application    [] other:      Other Objective/Functional Measures:   Mild pain with lateral movement with ladder drills  Fatigue with SL and eccentrics     Pain Level (0-10 scale) post treatment: 0    ASSESSMENT/Changes in Function: Good tolerance to exercises with no increase in knee pain except lateral movement with ladder drills. Added lateral lunges with TRX today with ability to complete without pain with proper form. Patient will continue to benefit from skilled PT services to modify and progress therapeutic interventions, address functional mobility deficits, address strength deficits, analyze and cue movement patterns, analyze and modify body mechanics/ergonomics, assess and modify postural abnormalities and instruct in home and community integration to attain remaining goals. []  See Plan of Care  []  See progress note/recertification  []  See Discharge Summary         Progress towards goals / Updated goals:  Long Term Goals: LTG- To be accomplished in 10 week(s):  1.  Pt will demonstrate ability to run and participate in football training without knee pain to demonstrate improved functional LE strength. Eval:jogging with minimal pain, unable to run, jump, squat without pain   LAst PN: not running at this time.  Pain with heel down and single leg squat   Current: able to run short distances without pain progressing      2.  Pt will be able to complete SL squat to parallel with good form and no pain to demonstrate improved functional glut strength.   Eval:DL squat with increased lumbar paraspinal use, decreased WB on right LE, unable to reach parallel without pain  Last PN:  Pain with single leg squat to 24 inch surface  Current:      3.  Pt right knee AROM flexion will equal that of left without pain to increase ease with stairs  Eval:right 130*, left 135* pain at end range   Last PN  140* right knee flexion, 2/10 pain   Current:  pain at nadeen range flexion still      4.  Pt FOTO score will increase by >/=22 points to show improvement in subjective function.   Eval:51  LAst PN: 66   Current: will reassess visit 10       PLAN  [x]  Upgrade activities as tolerated     [x]  Continue plan of care  []  Update interventions per flow sheet       []  Discharge due to:_  []  Other:_      Patricia Liu 7/24/2019  6:23 PM    Future Appointments   Date Time Provider [place] Land   7/29/2019  4:45 PM Patricia Liu [facility]   7/31/2019  3:45 PM Patricia Liu [facility]

## 2019-07-29 ENCOUNTER — HOSPITAL ENCOUNTER (OUTPATIENT)
Dept: PHYSICAL THERAPY | Age: 19
Discharge: HOME | End: 2019-07-29
Payer: COMMERCIAL

## 2019-07-29 PROCEDURE — 97016 VASOPNEUMATIC DEVICE THERAPY: CPT

## 2019-07-29 PROCEDURE — 97110 THERAPEUTIC EXERCISES: CPT

## 2019-07-29 NOTE — PROGRESS NOTES
PT DAILY TREATMENT NOTE    Patient Name: Claudeen Cork  Date:2019  : 2000  [x]  Patient  Verified  Payor: BLUE CROSS / Plan: Shreya Claros [id number] PPO / Product Type: PPO /    In time:5:00  Out time:6:05  Total Treatment Time (min): 65  Total Timed Codes (min): 65  1:1 Treatment Time ( only): 39   Visit #: 9 of 20    Treatment Area: Right knee pain [M25.561]    SUBJECTIVE  Pain Level (0-10 scale): 0  Any medication changes, allergies to medications, adverse drug reactions, diagnosis change, or new procedure performed?: [x] No    [] Yes (see summary sheet for update)  Subjective functional status/changes:   [] No changes reported  \"I ran 5 miles before coming here so I am sore. \"    OBJECTIVE    Modalities Rationale:     decrease edema, decrease inflammation and decrease pain to improve patient's ability to perform daily activities with decreased pain and symptom levels   min [] Estim, type/location:                                      []  att     []  unatt     []  w/US     []  w/ice    []  w/heat    min []  Mechanical Traction: type/lbs                   []  pro   []  sup   []  int   []  cont    []  before manual    []  after manual    min []  Ultrasound, settings/location:      min []  Iontophoresis w/ dexamethasone, location:                                               []  take home patch       []  in clinic    min []  Ice     []  Heat    location/position:    10 min [x]  Vasopneumatic Device, press/temp: Right knee, supine     min []  Other:    [x] Skin assessment post-treatment (if applicable):    [x]  intact    []  redness- no adverse reaction     []redness  adverse reaction:            55 min Therapeutic Exercise:  [x] See flow sheet :   Rationale: increase ROM and increase strength to improve the patients ability to perform daily activities with decreased pain and symptom levels     With   [] TE   [] TA   [] neuro   [] other: Patient Education: [x] Review HEP    [] Progressed/Changed HEP based on:   [] positioning   [] body mechanics   [] transfers   [] heat/ice application    [] other:      Other Objective/Functional Measures:   Fatigue with BOSU lunges and squats     Pain Level (0-10 scale) post treatment: 0    ASSESSMENT/Changes in Function: Pt able to run 5 miles today with Danella Saravia brace on without pain or instability in knee. Education on squat mechanics since practice resumes Thursday with increased lumbar extension noted. Patient will continue to benefit from skilled PT services to modify and progress therapeutic interventions, address functional mobility deficits, address ROM deficits, address strength deficits, analyze and cue movement patterns, analyze and modify body mechanics/ergonomics, assess and modify postural abnormalities and instruct in home and community integration to attain remaining goals. []  See Plan of Care  []  See progress note/recertification  []  See Discharge Summary         Progress towards goals / Updated goals:  Long Term Goals: LTG- To be accomplished in 10 week(s):  1.  Pt will demonstrate ability to run and participate in football training without knee pain to demonstrate improved functional LE strength. Eval:jogging with minimal pain, unable to run, jump, squat without pain   LAst PN: not running at this time.  Pain with heel down and single leg squat   Current: able to run short distances without pain progressing      2.  Pt will be able to complete SL squat to parallel with good form and no pain to demonstrate improved functional glut strength.   Eval:DL squat with increased lumbar paraspinal use, decreased WB on right LE, unable to reach parallel without pain  Last PN:  Pain with single leg squat to 24 inch surface  Current: increased lumbar extension with DL squat with bar (45#) progressing      3.  Pt right knee AROM flexion will equal that of left without pain to increase ease with stairs  Eval:right 130*, left 135* pain at end range   Last PN  140* right knee flexion, 2/10 pain   Current:  pain at nadeen range flexion still      4.  Pt FOTO score will increase by >/=22 points to show improvement in subjective function.   Eval:51  LAst PN: 66   Current: will reassess visit 10          PLAN  [x]  Upgrade activities as tolerated     [x]  Continue plan of care  []  Update interventions per flow sheet       []  Discharge due to:_  []  Other:_      Kristy Castillo 7/29/2019  5:01 PM    Future Appointments   Date Time Provider [place] Land   7/31/2019  3:45 PM Remesic, Sherren Miyamoto [id number]

## 2019-08-30 ENCOUNTER — HOSPITAL ENCOUNTER (OUTPATIENT)
Dept: PHYSICAL THERAPY | Age: 19
Discharge: HOME | End: 2019-08-30
Payer: COMMERCIAL

## 2019-08-30 PROCEDURE — 97110 THERAPEUTIC EXERCISES: CPT

## 2019-08-30 PROCEDURE — 97016 VASOPNEUMATIC DEVICE THERAPY: CPT

## 2019-08-30 NOTE — PROGRESS NOTES
PT DAILY TREATMENT NOTE    Patient Name: Luke Lee  Date:2019  : 2000  [x]  Patient  Verified  Payor: BLUE GABY / Plan: Shreya Claros [id number] PPO / Product Type: PPO /    In time:1:08  Out time:2:30  Total Treatment Time (min): 82  Total Timed Codes (min): 82  1:1 Treatment Time (MC only): 45   Visit #: 10 of 20    Treatment Area: Right knee pain [M25.561]    SUBJECTIVE  Pain Level (0-10 scale): 0  Any medication changes, allergies to medications, adverse drug reactions, diagnosis change, or new procedure performed?: [x] No    [] Yes (see summary sheet for update)  Subjective functional status/changes:   [] No changes reported  \"cutting and squatting are still hurting my knee. I am on medial this year for football. \"    OBJECTIVE    Modalities Rationale:     decrease edema, decrease inflammation and decrease pain to improve patient's ability to perform daily activities with decreased pain and symptom levels   min [] Estim, type/location:                                      []  att     []  unatt     []  w/US     []  w/ice    []  w/heat    min []  Mechanical Traction: type/lbs                   []  pro   []  sup   []  int   []  cont    []  before manual    []  after manual    min []  Ultrasound, settings/location:      min []  Iontophoresis w/ dexamethasone, location:                                               []  take home patch       []  in clinic    min []  Ice     []  Heat    location/position:    10 min [x]  Vasopneumatic Device, press/temp: Right knee, supine    min []  Other:    [x] Skin assessment post-treatment (if applicable):    [x]  intact    []  redness- no adverse reaction     []redness  adverse reaction:          72 min Therapeutic Exercise:  [x] See flow sheet :   Rationale: increase ROM and increase strength to improve the patients ability to perform daily activities with decreased pain and symptom levels          With   [] TE   [] TA   [] neuro   [] other: Patient Education: [x] Review HEP    [] Progressed/Changed HEP based on:   [] positioning   [] body mechanics   [] transfers   [] heat/ice application    [] other:      Other Objective/Functional Measures:   8/10 with cutting and squatting   HS right 4/5   140* bilateraly, no pain however pressure   Increased laxity posterior drawer right > left       Pain Level (0-10 scale) post treatment: 0    ASSESSMENT/Changes in Function: Pt presented to therapy after right PCL injury in April 2019. Pt was progressing nicely in PT with returning to football camp and practice however increased pain with cutting and weight lifting such as squats with max pain 8/10 in posterior and anterior knee. Conitnues to demonstrate decreased HS and glut strength with increased lumbar extension. ROM now [facility] without pain however continued posterior drawer laxity compared to left (PLC tear on left without surgery as well). Pt would benefit from continued skilled PT services to allow pt to return to football practice without knee pain. Patient will continue to benefit from skilled PT services to modify and progress therapeutic interventions, address functional mobility deficits, address strength deficits, analyze and cue movement patterns, analyze and modify body mechanics/ergonomics, assess and modify postural abnormalities and instruct in home and community integration to attain remaining goals. []  See Plan of Care  []  See progress note/recertification  []  See Discharge Summary         Progress towards goals / Updated goals:  Long Term Goals: LTG- To be accomplished in 10 week(s):  1.  Pt will demonstrate ability to run and participate in football training without knee pain to demonstrate improved functional LE strength. Eval:jogging with minimal pain, unable to run, jump, squat without pain   LAst PN: not running at this time.  Pain with heel down and single leg squat   Current: pain with cutting at practices progresssing       2.  Pt will be able to complete SL squat to parallel with good form and no pain to demonstrate improved functional glut strength.   Eval:DL squat with increased lumbar paraspinal use, decreased WB on right LE, unable to reach parallel without pain  Last PN:  Pain with single leg squat to 24 inch surface  Current: slight valgus, decreased pain with proper form progressing      3.  Pt right knee AROM flexion will equal that of left without pain to increase ease with stairs  Eval:right 130*, left 135* pain at end range   Last PN  140* right knee flexion, 2/10 pain   Current:   140* bilateral without pain goal MET     4.  Pt FOTO score will increase by >/=22 points to show improvement in subjective function. Eval:51  LAst PN: 66   Current: 54 - regression since last PN        PLAN  [x]  Upgrade activities as tolerated     [x]  Continue plan of care  []  Update interventions per flow sheet       []  Discharge due to:_  []  Other:_      Sumaya Weir 8/30/2019  1:14 PM    No future appointments.

## 2019-08-30 NOTE — PROGRESS NOTES
[facility] at the [address], [place], [address] [facility]  Phone: [phone]      Fax:  [phone]    Progress Note  Patient name: Luke Lee Start of Care: 19   Referral source: Hossein Murpyh MD : 2000   Medical/Treatment Diagnosis: Right knee pain [M25.561] Onset Date:2019     Prior Hospitalization: see medical history Provider#: [id number]   Medications: Verified on Patient Summary List    Comorbidities: left PCL tear 6 years ago, asthma  Prior Level of Function:football player for [employer]     Visits from Start of Care: 10    Missed Visits: 3  [address]- To be accomplished in 10 week(s):  1.  Pt will demonstrate ability to run and participate in football training without knee pain to demonstrate improved functional LE strength. Eval:jogging with minimal pain, unable to run, jump, squat without pain   LAst PN: not running at this time. Pain with heel down and single leg squat   Current: pain with cutting at practices progresssing       2.  Pt will be able to complete SL squat to parallel with good form and no pain to demonstrate improved functional glut strength.   Eval:DL squat with increased lumbar paraspinal use, decreased WB on right LE, unable to reach parallel without pain  Last PN:  Pain with single leg squat to 24 inch surface  Current: slight valgus, decreased pain with proper form progressing      3.  Pt right knee AROM flexion will equal that of left without pain to increase ease with stairs  Eval:right 130*, left 135* pain at end range   Last PN  140* right knee flexion, 2/10 pain   Current:   140* bilateral without pain goal MET     4.  Pt FOTO score will increase by >/=22 points to show improvement in subjective function. Eval:51  LAst PN: 66   Current: 54 - regression since last PN        Key Functional Changes: Pt presented to therapy after right PCL injury in 2019.  Pt was progressing nicely in PT with returning to football camp and practice however increased pain with cutting and weight lifting such as squats with max pain 8/10 in posterior and anterior knee. Conitnues to demonstrate decreased HS and glut strength with increased lumbar extension. ROM now [facility] without pain however continued posterior drawer laxity compared to left (PLC tear on left without surgery as well). Pt would benefit from continued skilled PT services to allow pt to return to football practice without knee pain. Updated Goals: to be achieved in 6 weeks:   See unmet above    ASSESSMENT/RECOMMENDATIONS:  [x]Continue therapy per initial plan/protocol at a frequency of  2 x per week for 6 weeks  []Continue therapy with the following recommended changes:_____________________      _____________________________________________________________________  []Discontinue therapy progressing towards or have reached established goals  []Discontinue therapy due to lack of appreciable progress towards goals  []Discontinue therapy due to lack of attendance or compliance  []Await Physician's recommendations/decisions regarding therapy  []Other:________________________________________________________________    Thank you for this referral.   Omer Stager Remesic 8/30/2019 1:46 PM  NOTE TO PHYSICIAN:  [address]   FAX TO [facility]: [phone]  If you are unable to process this request in 24 hours please contact our office: [phone]        []  I have read the above report and request that my patient continue as recommended. []  I have read the above report and request that my patient continue therapy with the following changes/special instructions:________________________________________  []I have read the above report and request that my patient be discharged from therapy.     Physician's Signature:____________Date:_________TIME:________    [facility], Date and Time must be completed for valid certification **

## 2019-09-10 ENCOUNTER — HOSPITAL ENCOUNTER (OUTPATIENT)
Dept: PHYSICAL THERAPY | Age: 19
Discharge: HOME | End: 2019-09-10
Payer: COMMERCIAL

## 2019-09-10 PROCEDURE — 97140 MANUAL THERAPY 1/> REGIONS: CPT

## 2019-09-10 PROCEDURE — 97110 THERAPEUTIC EXERCISES: CPT

## 2019-09-10 PROCEDURE — 97016 VASOPNEUMATIC DEVICE THERAPY: CPT

## 2019-09-10 NOTE — PROGRESS NOTES
PT DAILY TREATMENT NOTE    Patient Name: China Galeas  Date:9/10/2019  : 2000  [x]  Patient  Verified  Payor: BLUE CROSS / Plan: Shreya Claros [id number] PPO / Product Type: PPO /    In time:3:17  Out time:4:35  Total Treatment Time (min): 79  Total Timed Codes (min): 79  1:1 Treatment Time (MC only): 55   Visit #: 11 of 15    Treatment Area: Right knee pain [M25.561]    SUBJECTIVE  Pain Level (0-10 scale): 0  Any medication changes, allergies to medications, adverse drug reactions, diagnosis change, or new procedure performed?: [x] No    [] Yes (see summary sheet for update)  Subjective functional status/changes:   [] No changes reported  \"I have just been doing upper body at the gym for working out. \"    OBJECTIVE    Modalities Rationale:     decrease edema, decrease inflammation and decrease pain to improve patient's ability to perform daily activities with decreased pain and symptom levels   min [] Estim, type/location:                                      []  att     []  unatt     []  w/US     []  w/ice    []  w/heat    min []  Mechanical Traction: type/lbs                   []  pro   []  sup   []  int   []  cont    []  before manual    []  after manual    min []  Ultrasound, settings/location:      min []  Iontophoresis w/ dexamethasone, location:                                               []  take home patch       []  in clinic    min []  Ice     []  Heat    location/position:    10 min [x]  Vasopneumatic Device, press/temp: Right knee, supine     min []  Other:    [x] Skin assessment post-treatment (if applicable):    [x]  intact    []  redness- no adverse reaction     []redness  adverse reaction:           69 min Therapeutic Exercise:  [x] See flow sheet :   Rationale: increase ROM and increase strength to improve the patients ability to perform daily activities with decreased pain and symptom levels            With   [] TE   [] TA   [] neuro   [] other: Patient Education: [x] Review HEP    [] Progressed/Changed HEP based on:   [] positioning   [] body mechanics   [] transfers   [] heat/ice application    [] other:      Other Objective/Functional Measures:   Fatigue with trampoline  Decreased pain with step downs with proper form     Pain Level (0-10 scale) post treatment: 0    ASSESSMENT/Changes in Function: Discussed with pt surgery today due to pt expressing interest. Pt has f/u with MD on 9/18/19. Continues to demonstrate decreased functional glut and HS strength. Patient will continue to benefit from skilled PT services to modify and progress therapeutic interventions, address functional mobility deficits, address strength deficits, analyze and cue movement patterns, analyze and modify body mechanics/ergonomics, assess and modify postural abnormalities and instruct in home and community integration to attain remaining goals. []  See Plan of Care  []  See progress note/recertification  []  See Discharge Summary         Progress towards goals / Updated goals:  Long Term Goals: LTG- To be accomplished in 10 week(s):  1.  Pt will demonstrate ability to run and participate in football training without knee pain to demonstrate improved functional LE strength.   Eval:jogging with minimal pain, unable to run, jump, squat without pain   LAst PN:  pain with cutting at practices  Current:      2.  Pt will be able to complete SL squat to parallel with good form and no pain to demonstrate improved functional glut strength.   Eval:DL squat with increased lumbar paraspinal use, decreased WB on right LE, unable to reach parallel without pain  Last PN: slight valgus, decreased pain with proper form  Current: challenged with trampoline exercises and lunge cross connect today      3.  Pt right knee AROM flexion will equal that of left without pain to increase ease with stairs  Eval:right 130*, left 135* pain at end range   Last PN  140* bilateral without pain goal MET     4.  Pt FOTO score will increase by >/=22 points to show improvement in subjective function. Eval:51  LAst PN: : 54   Current:         PLAN  [x]  Upgrade activities as tolerated     [x]  Continue plan of care  []  Update interventions per flow sheet       []  Discharge due to:_  []  Other:_      Kristy Castillo 9/10/2019  3:23 PM    No future appointments.

## 2019-09-11 ENCOUNTER — HOSPITAL ENCOUNTER (OUTPATIENT)
Dept: PHYSICAL THERAPY | Age: 19
Discharge: HOME | End: 2019-09-11
Payer: COMMERCIAL

## 2019-09-11 PROCEDURE — 97110 THERAPEUTIC EXERCISES: CPT

## 2019-09-11 PROCEDURE — 97140 MANUAL THERAPY 1/> REGIONS: CPT

## 2019-09-11 PROCEDURE — 97016 VASOPNEUMATIC DEVICE THERAPY: CPT

## 2019-09-11 NOTE — PROGRESS NOTES
PT DAILY TREATMENT NOTE    Patient Name: Anders Overton  Date:2019  : 2000  [x]  Patient  Verified  Payor: BLUE CROSS / Plan: Shreya Claros [id number] PPO / Product Type: PPO /    In time: 4:35 Out time:5;45  Total Treatment Time (min): 70  Total Timed Codes (min): 70  1:1 Treatment Time (MC only): 45   Visit #: 12 of 15    Treatment Area: Right knee pain [M25.561]    SUBJECTIVE  Pain Level (0-10 scale): 0  Any medication changes, allergies to medications, adverse drug reactions, diagnosis change, or new procedure performed?: [x] No    [] Yes (see summary sheet for update)  Subjective functional status/changes:   [] No changes reported  \"Knee feels good. I was sore this morning but fine now. I have a f/u with a MD in [facility] on Monday. \"    OBJECTIVE    Modalities Rationale:     decrease edema, decrease inflammation and decrease pain to improve patient's ability to perform daily activities with decreased pain and symptom levels   min [] Estim, type/location:                                      []  att     []  unatt     []  w/US     []  w/ice    []  w/heat    min []  Mechanical Traction: type/lbs                   []  pro   []  sup   []  int   []  cont    []  before manual    []  after manual    min []  Ultrasound, settings/location:      min []  Iontophoresis w/ dexamethasone, location:                                               []  take home patch       []  in clinic    min []  Ice     []  Heat    location/position:    10 min [x]  Vasopneumatic Device, press/temp: Supine, right knee     min []  Other:    [x] Skin assessment post-treatment (if applicable):    [x]  intact    []  redness- no adverse reaction     []redness  adverse reaction:          60 min Therapeutic Exercise:  [x] See flow sheet :   Rationale: increase ROM and increase strength to improve the patients ability to perform daily activities with decreased pain and symptom levels    With   [] TE   [] TA   [] neuro   [] other: Patient Education: [x] Review HEP    [] Progressed/Changed HEP based on:   [] positioning   [] body mechanics   [] transfers   [] heat/ice application    [] other:      Other Objective/Functional Measures:   No pain with ladder drills     Pain Level (0-10 scale) post treatment: 0    ASSESSMENT/Changes in Function: good form with SL squats today without pain. Able to complete ladder drills and box jumps downs without pain as well. Pt has f/u with surgeon on Monday. Patient will continue to benefit from skilled PT services to modify and progress therapeutic interventions, address functional mobility deficits, address strength deficits, analyze and cue movement patterns, analyze and modify body mechanics/ergonomics, assess and modify postural abnormalities and instruct in home and community integration to attain remaining goals. []  See Plan of Care  []  See progress note/recertification  []  See Discharge Summary         Progress towards goals / Updated goals:  Long Term Goals: LTG- To be accomplished in 10 week(s):  1.  Pt will demonstrate ability to run and participate in football training without knee pain to demonstrate improved functional LE strength.   Eval:jogging with minimal pain, unable to run, jump, squat without pain   LAst PN:  pain with cutting at practices  Current: no pain with ladder drills and hurdles today - progressing      2.  Pt will be able to complete SL squat to parallel with good form and no pain to demonstrate improved functional glut strength.   Eval:DL squat with increased lumbar paraspinal use, decreased WB on right LE, unable to reach parallel without pain  Last PN: slight valgus, decreased pain with proper form  Current: challenged with trampoline exercises and lunge cross connect today      3.  Pt right knee AROM flexion will equal that of left without pain to increase ease with stairs  Eval:right 130*, left 135* pain at end range   Last PN  140* bilateral without pain goal MET     4.  Pt FOTO score will increase by >/=22 points to show improvement in subjective function. Eval:51  LAst PN: : 54   Current:     PLAN  [x]  Upgrade activities as tolerated     [x]  Continue plan of care  []  Update interventions per flow sheet       []  Discharge due to:_  []  Other:_      Cornelia Wilmarcal 9/11/2019  4:49 PM    No future appointments.

## 2019-09-18 NOTE — PROGRESS NOTES
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  Pt will demonstrate ability to run and participate in football training without knee pain to demonstrate improved functional LE strength. Eval:jogging with minimal pain, unable to run, jump, squat without pain   LAst PN:  pain with cutting at practices  Current: no pain with ladder drills and hurdles today - progressing      2.  Pt will be able to complete SL squat to parallel with good form and no pain to demonstrate improved functional glut strength.   Eval:DL squat with increased lumbar paraspinal use, decreased WB on right LE, unable to reach parallel without pain  Last PN: slight valgus, decreased pain with proper form  Current: challenged with trampoline exercises and lunge cross connect today progressing      3.  Pt right knee AROM flexion will equal that of left without pain to increase ease with stairs  Eval:right 130*, left 135* pain at end range   Last PN  140* bilateral without pain goal MET     4.  Pt FOTO score will increase by >/=22 points to show improvement in subjective function. Eval:51  LAst PN: 54   Current: not assessed since last PN        Assessment/ Summary of Care: Pt presented to therapy after right PCL injury in 2019.  Pt has attended 13 sessions with making good progress towards goals however continues to have pain with squats, running and cutting. ROM now [facility] without pain and able to complete ladder drills with less pain however. Pt is ready to be discharged at this time since getting surgery for right knee to repair PCL to be able to return to playing football without knee pain.      RECOMMENDATIONS:  [x]Discontinue therapy: [x]Patient has reached or is progressing toward set goals      []Patient is non-compliant or has abdicated      []Due to lack of appreciable progress towards set goals    Rosalba De La CruzOsteopathic Hospital of Rhode Islandmonse 9/18/2019 5:32 PM

## 2019-10-08 ENCOUNTER — HOSPITAL ENCOUNTER (OUTPATIENT)
Dept: PHYSICAL THERAPY | Age: 19
Discharge: HOME | End: 2019-10-08
Payer: COMMERCIAL

## 2019-10-08 PROCEDURE — 97016 VASOPNEUMATIC DEVICE THERAPY: CPT

## 2019-10-08 PROCEDURE — 97161 PT EVAL LOW COMPLEX 20 MIN: CPT

## 2019-10-08 PROCEDURE — 97110 THERAPEUTIC EXERCISES: CPT

## 2019-10-08 NOTE — PROGRESS NOTES
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 Pt was attending PT with making good progress towards goals with recently having f/u with surgeon with plans to continue with conservative management. Pt continues to c/o knee pain with squatting past 90*, running, cutting and jumping. Pt demonstrates WFL knee ROM without pain, decreased bilateral hip IR/ER ROM, decreased glut and HS strength, poor squat form with increased lumbar paraspinal use and weight shift to right, and positive Lachmans and posterior sag sign bilaterally with right > left. Pt would benefit from skilled PT services to address the above impairments to allow pt to return to playing football without pain.      Evaluation Complexity History MEDIUM  Complexity : 1-2 comorbidities / personal factors will impact the outcome/ POC ; Examination MEDIUM Complexity : 3 Standardized tests and measures addressing body structure, function, activity limitation and / or participation in recreation  ;Presentation LOW Complexity : Stable, uncomplicated  ;Clinical Decision Making MEDIUM Complexity : FOTO score of 26-74  Overall Complexity Rating: LOW   Problem List: pain affecting function, decrease ROM, decrease strength, impaired gait/ balance, decrease ADL/ functional abilitiies, decrease activity tolerance, decrease flexibility/ joint mobility and decrease transfer abilities   Treatment Plan may include any combination of the following: Therapeutic exercise, Therapeutic activities, Neuromuscular re-education, Physical agent/modality, Gait/balance training, Manual therapy, Patient education, Self Care training, Functional mobility training, Home safety training and Stair training  Patient / Family readiness to learn indicated by: asking questions, trying to perform skills and interest  Persons(s) to be included in education: patient (P)  Barriers to Learning/Limitations: None  Patient Goal (s): get back on the field. \"  Patient Self Reported Health Status: good  Rehabilitation Potential: excellent    Short Term Goals: STG- To be accomplished in 4 week(s):  1. Pt will be independent with HEP to encourage prophylaxis. Eval: HEP dispensed     2. Pt will be able to complete SL squat to parallel without pain and good form with in preparation for return to running  Eval: weight shift to right with DL, increased lumbar paraspinal use     Long Term Goals: LTG- To be accomplished in 8 week(s):  1. Pt will demonstrate ability to participate in football practices without pain to return to prior level of function  Eval: 8/10 pain with running, cutting, jumping         2. Pt bilateral hip IR and ER ROM  will improve to [facility] to allow pt to squat with less pain. Eval:left  IR :30*, 22* ER               Right: IR 30*, 32* ER      3. Pt FOTO score will increase by >/=15 points to show improvement in subjective function. Eval:60  Current: will address at visit 5      Frequency / Duration: Patient to be seen 2 times per week for 8 weeks.     Patient/ Caregiver education and instruction: Diagnosis, prognosis, self care, activity modification and exercises   [x]  Plan of care has been reviewed with JI IRELAND Remesic 10/8/2019 6:34 PM  _____________________________________________________________________  I certify that the above Therapy Services are being furnished while the patient is under my care. I agree with the treatment plan and certify that this therapy is necessary.     Physician's Signature:____________Date:_________TIME:________    [employer], Date and Time must be completed for valid certification **    Please sign and return to [facility] at the [address], [address] [facility]       Phone: [phone]      Fax:  [phone]

## 2019-10-10 ENCOUNTER — HOSPITAL ENCOUNTER (OUTPATIENT)
Dept: PHYSICAL THERAPY | Age: 19
Discharge: HOME | End: 2019-10-10
Payer: COMMERCIAL

## 2019-10-10 PROCEDURE — 97112 NEUROMUSCULAR REEDUCATION: CPT

## 2019-10-10 PROCEDURE — 97110 THERAPEUTIC EXERCISES: CPT

## 2019-10-10 PROCEDURE — 97140 MANUAL THERAPY 1/> REGIONS: CPT

## 2019-10-10 PROCEDURE — 97016 VASOPNEUMATIC DEVICE THERAPY: CPT

## 2019-10-10 NOTE — PROGRESS NOTES
PT DAILY TREATMENT NOTE    Patient Name: Shruthi Garcia  Date:10/10/2019  : 2000  [x]  Patient  Verified  Payor: Clementine Jolley / Plan: Shreya Claros [id number] PPO / Product Type: PPO /    In time:2:30 pm  Out time:3:20 pm   Total Treatment Time (min): 50  Total Timed Codes (min): 40  1:1 Treatment Time ( only): 40   Visit #: 2 of 16    Treatment Area: Knee pain, right [M25.561]    SUBJECTIVE  Pain Level (0-10 scale): 0  Any medication changes, allergies to medications, adverse drug reactions, diagnosis change, or new procedure performed?: [x] No    [] Yes (see summary sheet for update)  Subjective functional status/changes:   [] No changes reported  \"I am good. \"    OBJECTIVE    Modalities Rationale: decrease inflammation to return to daily activities and sport specific play without pain or restriction     min [] Estim, type/location:                                      []  att     []  unatt     []  w/US     []  w/ice    []  w/heat    min []  Mechanical Traction: type/lbs                   []  pro   []  sup   []  int   []  cont    []  before manual    []  after manual    min []  Ultrasound, settings/location:      min []  Iontophoresis w/ dexamethasone, location:                                               []  take home patch       []  in clinic    min []  Ice     []  Heat    location/position:    10 min [x]  Vasopneumatic Device, press/temp:  To right in supine with LE elevated      min []  Other:    [x] Skin assessment post-treatment (if applicable):    [x]  intact    []  redness- no adverse reaction     []redness  adverse reaction:          15 min Therapeutic Exercise:  [x]  See flow sheet : assessment of measures, pt education on exercise purpose   Rationale: increase ROM, increase strength, improve coordination and increase proprioception  to improve the patients ability to return to daily activities and sport specific play without pain or restriction     15 min Neuromuscular Re-education: [x]  See flow sheet : balloon to facilitate diaphragm, band tofaciliate glut    Rationale: increase ROM, increase strength, improve coordination and increase proprioception  to improve the patients ability to return to daily activities and sport specific play without pain or restriction    10 min Manual Therapy:  Right apical expansion   rightfascial pec stretch   1-person rib mobilization   Manual stretching of left posterior hip capsule with right iliac depression in left S/L    Rationale: decrease pain, increase ROM, increase tissue extensibility and increase postural awareness to improve the patients ability to return to daily activities and sport specific play without pain or restriction          With   [] TE   [] TA   [] neuro   [] other: Patient Education: [x] Review HEP    [] Progressed/Changed HEP based on:   [] positioning   [] body mechanics   [] transfers   [] heat/ice application    [] other:      Other Objective/Functional Measures:   Special Tests (pre/post)  HGIR:     Right: 44/90  Left: 52/90  Hip Add Drop Test:   Right: +/-  Left: unable to test/-  Trunk Rot    Right: 18 in/16 in Left: 18 in/16 in       Pain Level (0-10 scale) post treatment: 0    ASSESSMENT/Changes in Function:   Pt very challenged with balloon inflation. Presents in a very extended tight lateralized posture. Responded well to left oblique facilitation and then right glut facilitation. Required max cuse and supports with activities. Overall responded well to treatment reported feeling lighter at end of treatment.      Patient will continue to benefit from skilled PT services to modify and progress therapeutic interventions, address functional mobility deficits, address ROM deficits, address strength deficits, analyze and address soft tissue restrictions, analyze and cue movement patterns, analyze and modify body mechanics/ergonomics, assess and modify postural abnormalities and instruct in home and community integration to attain remaining goals. []  See Plan of Care  []  See progress note/recertification  []  See Discharge Summary         Progress towards goals / Updated goals:  Short Term Goals: STG- To be accomplished in 4 week(s):  1. Pt will be independent with HEP to encourage prophylaxis. Eval: HEP dispensed  Current: provided balloons for seated balloon and 90/90     2. Pt will be able to complete SL squat to parallel without pain and good form with in preparation for return to running  Eval: weight shift to right with DL, increased lumbar paraspinal use     Long Term Goals: LTG- To be accomplished in 8 week(s):  1. Pt will demonstrate ability to participate in football practices without pain to return to prior level of function  Eval: 8/10 pain with running, cutting, jumping         2. Pt bilateral hip IR and ER ROM  will improve to [facility] to allow pt to squat with less pain. Eval:left  IR :30*, 22* ER               Right: IR 30*, 32* ER      3. Pt FOTO score will increase by >/=15 points to show improvement in subjective function.   Eval:60  Current: will address at visit 5    PLAN  [x]  Upgrade activities as tolerated     []  Continue plan of care  []  Update interventions per flow sheet       []  Discharge due to:_  []  Other:_      Alber Yoo, PT, DPT 10/10/2019  2:34 PM    Future Appointments   Date Time Provider [place]   10/15/2019  3:00 PM Didier Pike PTA [facility]   10/17/2019  5:00 PM Pedrito Liu [facility]   10/22/2019  3:00 PM Didier Pike PTA [facility]   10/24/2019  3:30 PM Didier Pike P[facility]   10/29/2019  3:45 PM Didier Pike PTA [facility]   10/31/2019  2:45 PM Pedrito Liu [facility]   11/5/2019  2:15 PM Didier Pike PTA [facility]   11/7/2019  3:30 PM Link JI Pike [facility]   11/12/2019  3:00 PM Pedrito Liu [facility]   11/14/2019  2:45 PM Link JI Pike [facility]   11/19/2019  2:15 PM Link JI Pike [facility]   11/21/2019 3:30 PM Adrienne Liu THE [facility]

## 2019-10-17 ENCOUNTER — HOSPITAL ENCOUNTER (OUTPATIENT)
Dept: PHYSICAL THERAPY | Age: 19
Discharge: HOME | End: 2019-10-17
Payer: COMMERCIAL

## 2019-10-17 PROCEDURE — 97112 NEUROMUSCULAR REEDUCATION: CPT

## 2019-10-17 PROCEDURE — 97110 THERAPEUTIC EXERCISES: CPT

## 2019-10-17 NOTE — PROGRESS NOTES
PT DAILY TREATMENT NOTE    Patient Name: Carmen Morse  Date:10/17/2019  : 2000   [x]  Patient  Verified  Payor: BLUE CROSS / Plan: Shreya Claros [id number] PPO / Product Type: PPO /    In time:4:15  Out time:5:10  Total Treatment Time (min): 50  Total Timed Codes (min): 50  1:1 Treatment Time (MC only): 50   Visit #: 3 of 16    Treatment Area: Knee pain, right [M25.561]    SUBJECTIVE  Pain Level (0-10 scale): 0  Any medication changes, allergies to medications, adverse drug reactions, diagnosis change, or new procedure performed?: [x] No    [] Yes (see summary sheet for update)  Subjective functional status/changes:   [] No changes reported  \"I definitely felt looser after last time. \"    OBJECTIVE      35 min Therapeutic Exercise:  [x] See flow sheet :   Rationale: increase ROM and increase strength to improve the patients ability to perform daily activities with decreased pain and symptom levels     15 min Neuromuscular Re-education:  [x]  See flow sheet :   Rationale: increase ROM, increase strength, improve coordination and increase proprioception  to improve the patients ability to perform daily activities with decreased pain and symptom levels      With   [] TE   [] TA   [] neuro   [] other: Patient Education: [x] Review HEP    [] Progressed/Changed HEP based on:   [] positioning   [] body mechanics   [] transfers   [] heat/ice application    [] other:      Other Objective/Functional Measures:   Decreased mobility with QP    Pain Level (0-10 scale) post treatment: 0    ASSESSMENT/Changes in Function: Improved balloon inflation today with good response to treatment. Challenged with QP due to decreased thoracic mobiltity. Tactile cues needed for lady in glasess due to posterior shift.      Patient will continue to benefit from skilled PT services to modify and progress therapeutic interventions, address functional mobility deficits, address strength deficits, analyze and cue movement patterns, analyze and modify body mechanics/ergonomics, assess and modify postural abnormalities and instruct in home and community integration to attain remaining goals. []  See Plan of Care  []  See progress note/recertification  []  See Discharge Summary         Progress towards goals / Updated goals:  Short Term Goals: STG- To be accomplished in 4 week(s):  1.  Pt will be independent with HEP to encourage prophylaxis. Eval: HEP dispensed  Current: compliance per pt report goal MET     2. Pt will be able to complete SL squat to parallel without pain and good form with in preparation for return to running  Eval: weight shift to right with DL, increased lumbar paraspinal use  Current: decreasing lumbar paraspinal use with DL     Long Term Goals: LTG- To be accomplished in 8 week(s):  1.  Pt will demonstrate ability to participate in football practices without pain to return to prior level of function  Eval: 8/10 pain with running, cutting, jumping         2.  Pt bilateral hip IR and ER ROM  will improve to GRE[facility] allow pt to squat with less pain. Eval:left  IR :30*, 22* ER               YFSKD: IR 30*, 32* ER      3.  Pt FOTO score will increase by >/=15 points to show improvement in subjective function.   Eval:60  Current: will address at visit 5    PLAN  [x]  Upgrade activities as tolerated     [x]  Continue plan of care  []  Update interventions per flow sheet       []  Discharge due to:_  []  Other:_      Vanessa Right 10/17/2019  4:15 PM    Future Appointments   Date Time Provider [place]   10/22/2019  3:00 PM Janes Montiel [facility]   10/24/2019  3:30 PM Sally Aguirre, PTA [facility]   10/29/2019  3:45 PM Sally Aguirre, PTA [facility]   10/31/2019  2:45 PM Stalin Liu [facility]   11/5/2019  2:15 PM Sally Aguirre, PTA [facility]   11/7/2019  3:30 PM Sally Aguirre, PTA [facility]   11/12/2019  3:00 PM Stalin Liu [facility]   11/14/2019  2:45 PM Damian Molina, PTA [facility]   11/19/2019  2:15 PM Alysha Rizvi [facility]   11/21/2019  3:30 PM Nahomy [facility]BGOYO [facility]

## 2019-10-24 ENCOUNTER — APPOINTMENT (OUTPATIENT)
Dept: PHYSICAL THERAPY | Age: 19
End: 2019-10-24
Payer: COMMERCIAL

## 2019-10-29 ENCOUNTER — HOSPITAL ENCOUNTER (OUTPATIENT)
Dept: PHYSICAL THERAPY | Age: 19
Discharge: HOME | End: 2019-10-29
Payer: COMMERCIAL

## 2019-10-29 PROCEDURE — 97112 NEUROMUSCULAR REEDUCATION: CPT

## 2019-10-29 PROCEDURE — 97016 VASOPNEUMATIC DEVICE THERAPY: CPT

## 2019-10-29 PROCEDURE — 97110 THERAPEUTIC EXERCISES: CPT

## 2019-10-29 NOTE — PROGRESS NOTES
PT DAILY TREATMENT NOTE    Patient Name: Marleny Davis  Date:10/29/2019  : 2000  [x]  Patient  Verified  Payor: BLUE GABY / Plan: Shreya Claros [id number] PPO / Product Type: PPO /    In time:4:10  Out time:5:00  Total Treatment Time (min): 50  Total Timed Codes (min): 50  1:1 Treatment Time (MC only): 50   Visit #: 4 of 16    Treatment Area: Knee pain, right [M25.561]    SUBJECTIVE  Pain Level (0-10 scale): 0  Any medication changes, allergies to medications, adverse drug reactions, diagnosis change, or new procedure performed?: [x] No    [] Yes (see summary sheet for update)  Subjective functional status/changes:   [] No changes reported  \"Its better. I started jogging. \"     OBJECTIVE    Modalities Rationale:     decrease edema, decrease inflammation and decrease pain to improve patient's ability to perform pain free ADL's    min [] Estim, type/location:                                      []  att     []  unatt     []  w/US     []  w/ice    []  w/heat    min []  Mechanical Traction: type/lbs                   []  pro   []  sup   []  int   []  cont    []  before manual    []  after manual    min []  Ultrasound, settings/location:      min []  Iontophoresis w/ dexamethasone, location:                                               []  take home patch       []  in clinic    min []  Ice     []  Heat    location/position:    10 min [x]  Vasopneumatic Device, press/temp:  To right knee     min []  Other:    [x] Skin assessment post-treatment (if applicable):    []  intact    []  redness- no adverse reaction     []redness  adverse reaction:          25 min Therapeutic Exercise:  [] See flow sheet :   Rationale: increase ROM, increase strength, improve coordination and improve balance to improve the patients ability to perform pain free ADL's       15 min Neuromuscular Re-education:  [x]  See flow sheet :   Rationale: increase ROM, increase strength, improve coordination and improve balance  to improve the patients ability to perform pain free ADL\"s           With   [x] TE   [] TA   [] neuro   [] other: Patient Education: [x] Review HEP    [] Progressed/Changed HEP based on:   [] positioning   [] body mechanics   [] transfers   [] heat/ice application    [] other:      Other Objective/Functional Measures:   Special Tests (pre/post)    Hip Add Drop Test:   Right: past midline/-      Left: past midline /-    Trunk Rot    Right: 15/15  Left: 15/15    SLR      RIGHT 90  Left 90       Pain Level (0-10 scale) post treatment: 0    ASSESSMENT/Changes in Function:   Pt presented in therapy with no c/o pain. Reported has been able to start return to jog program without pain. Pt presented with improved pelvic mobility and stability and decreased pain. Added legt glut med exercise      Patient will continue to benefit from skilled PT services to modify and progress therapeutic interventions, address functional mobility deficits, address ROM deficits, address strength deficits, analyze and address soft tissue restrictions, analyze and cue movement patterns, analyze and modify body mechanics/ergonomics, assess and modify postural abnormalities, address imbalance/dizziness and instruct in home and community integration to attain remaining goals. []  See Plan of Care  []  See progress note/recertification  []  See Discharge Summary         Progress towards goals / Updated goals:  Progress towards goals / Updated goals:  Short Term Goals: STG- To be accomplished in 4 week(s):  1.  Pt will be independent with HEP to encourage prophylaxis.   Eval: HEP dispensed  Current: compliance per pt report goal MET     2. Pt will be able to complete SL squat to parallel without pain and good form with in preparation for return to running  Eval: weight shift to right with DL, increased lumbar paraspinal use  Current: decreasing lumbar paraspinal use with DL     Long Term Goals: LTG- To be accomplished in 8 week(s):  1.  Pt will demonstrate ability to participate in football practices without pain to return to prior level of function  Eval: 8/10 pain with running, cutting, jumping   Current: return to jogging without pain. 10/29/19        2.  Pt bilateral hip IR and ER ROM  will improve to [facility] allow pt to squat with less pain. Eval:left  IR :30*, 22* ER               CEEDC: IR 30*, 32* ER      3.  Pt FOTO score will increase by >/=15 points to show improvement in subjective function.   Eval:60  Current: will address at visit 5       PLAN  [x]  Upgrade activities as tolerated     [x]  Continue plan of care  []  Update interventions per flow sheet       []  Discharge due to:_  []  Other:_      Duc Crews PTA 10/29/2019  4:11 PM    Future Appointments   Date Time Provider Al[place]ina Land   10/31/2019  2:45 PM Nikole Liu [facility]   11/5/2019  2:15 PM Nely Olson PTA [facility]   11/7/2019  3:30 PM Nely Olson PTA [facility]   11/12/2019  3:00 PM Nikole LiuHPRIGO [facility]   11/14/2019  2:45 PM Nely Olson PTA [facility]   11/19/2019  2:15 PM Nely Olson PTA [facility]   11/21/2019  3:30 PM Nikole Liu [facility]

## 2019-11-07 ENCOUNTER — HOSPITAL ENCOUNTER (OUTPATIENT)
Dept: PHYSICAL THERAPY | Age: 19
End: 2019-11-07

## 2019-11-14 ENCOUNTER — APPOINTMENT (OUTPATIENT)
Dept: PHYSICAL THERAPY | Age: 19
End: 2019-11-14

## 2019-11-19 ENCOUNTER — APPOINTMENT (OUTPATIENT)
Dept: PHYSICAL THERAPY | Age: 19
End: 2019-11-19

## 2019-11-21 ENCOUNTER — APPOINTMENT (OUTPATIENT)
Dept: PHYSICAL THERAPY | Age: 19
End: 2019-11-21

## 2019-11-21 NOTE — PROGRESS NOTES
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  Pt will be independent with HEP to encourage prophylaxis. Eval: HEP dispensed  Current: compliance per pt report goal MET     2. Pt will be able to complete SL squat to parallel without pain and good form with in preparation for return to running  Eval: weight shift to right with DL, increased lumbar paraspinal use  Current: decreasing lumbar paraspinal use with DL progressing      Long Term Goals: LTG- To be accomplished in 8 week(s):  1.  Pt will demonstrate ability to participate in football practices without pain to return to prior level of function  Eval: 8/10 pain with running, cutting, jumping   Current: return to jogging without pain. Progressing       2.  Pt bilateral hip IR and ER ROM  will improve to GRE[facility] allow pt to squat with less pain. Eval:left  IR :30*, 22* ER               BNJRN: IR 30*, 32* ER   Current: improving trunk rotation, and negative hip add drop test progressing      3.  Pt FOTO score will increase by >/=15 points to show improvement in subjective function. Eval:60  Current: will address at visit 5     Assessment/ Summary of Care: Pt presented to PT with c/c right anterior and posterior knee pain since PCL injury spring 2019.  Pt only attended 4 sessions including initial eval due to poor compliance with attended therapy sessions due to work scheduling and forgetting appointments. Pt however did make some progress towards goals with ability to return to jogging without pain. Pt is ready to be DC at this time due to non compliance despite education on importance.      RECOMMENDATIONS:  [x]Discontinue therapy: []Patient has reached or is progressing toward set goals      [x]Patient is non-compliant or has abdicated      []Due to lack of appreciable progress towards set goals    Brando IRELAND Remesic 11/21/2019 9:07 AM

## 2025-03-05 NOTE — PROGRESS NOTES
Conservative care, + now with O2 needs.    2/24:  Continue conservative care.  Symptoms improving.  Patient with stable O2 sats on room air.    2/27:  Stable.  Continue conservative care.    3/2:  Resolved     PT DAILY TREATMENT NOTE/KNEE EVAL 10-18    Patient Name: Miller Jara  Date:10/8/2019  : 2000  [x]  Patient  Verified  Payor: BLUE CROSS / Plan: [facility] PPO / Product Type: PPO /    In time:2:15  Out time:3:10  Total Treatment Time (min): 55  Visit #: 1 of 16    Medicare/BCBS Only   Total Timed Codes (min):  15 1:1 Treatment Time:  55     Treatment Area: Knee pain, right [M25.561]    SUBJECTIVE  Pain Level (0-10 scale): 0  []constant [x]intermittent []improving []worsening []no change since onset    Any medication changes, allergies to medications, adverse drug reactions, diagnosis change, or new procedure performed?: [x] No    [] Yes (see summary sheet for update)  Subjective functional status/changes:     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 MRI resutls grade II PCL tear   Current symptoms/Complaints:  Pain increase to 7-8/10 with activity in posterior> anterior knee pain. 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 \"     OBJECTIVE/EXAMINATION      Modality rationale: decrease edema, decrease inflammation and decrease pain to improve the patients ability to perform daily activities with decreased pain and symptom levels     Min Type Additional Details    [] Estim:  []Unatt       []IFC  []Premod                        []Other:  []w/ice   []w/heat  Position:  Location:    [] Estim: []Att    []TENS instruct  []NMES                    []Other:  []w/US   []w/ice   []w/heat  Position:  Location:    []  Traction: [] Cervical       []Lumbar                       [] Prone          []Supine                       []Intermittent   []Continuous Lbs:  [] before manual  [] after manual    []  Ultrasound: []Continuous   [] Pulsed                           []1MHz   []3MHz Location:  W/cm2:    []  Iontophoresis with dexamethasone         Location: [] Take home patch   [] In clinic    []  Ice     []  heat  []  Ice massage  []  Laser   []  Anodyne Position:  Location:    []  Laser with stim  []  Other: Position:  Location:   10 [x]  Vasopneumatic Device Pressure:       [] lo [x] med [] hi   Temperature: [x] lo [] med [] hi   [x] Skin assessment post-treatment:  [x]intact []redness- no adverse reaction    []redness  adverse reaction:     30 min [x]Eval                  []Re-Eval       15 min Therapeutic Exercise:  [x] See flow sheet :   Rationale: increase ROM and increase strength to improve the patients ability to perform daily activities with decreased pain and symptom levels    With   [] TE   [] TA   [] neuro   [] other: Patient Education: [x] Review HEP    [] Progressed/Changed HEP based on:   [] positioning   [] body mechanics   [] transfers   [] heat/ice application    [] other:      Other Objective/Functional Measures: see initial eval    Physical Therapy Evaluation - Knee    Posture: [] Varus    [] Valgus    [] Recurvatum        [] Tibial Torsion    [] Foot Supination    [] Foot Pronation    Describe:    Gait:  [] Normal    [] Abnormal    [] Antalgic    [] NWB    Device:    Describe:    ROM / Strength  [] Unable to assess                  AROM                             Strength (1-5)    Left Right Left Right Left Right   Hip Flexion    5 5     Extension    4- 4-     Abduction          Adduction         Knee Flexion 135* 139*  4- 4-     Extension 1* hyerp 0*  5 5    Ankle Plantarflexion    5 5     Dorsiflexion    5 5        Flexibility: [] Unable to assess at this time  Hamstrings:    (L) Tightness= [] WNL   [] Min   [x] Mod   [] Severe    (R) Tightness= [] WNL   [] Min   [x] Mod   [] Severe  Quadriceps:    (L) Tightness= [] WNL   [] Min   [x] Mod   [] Severe    (R) Tightness= [] WNL   [] Min   [x] Mod   [] Severe  Gastroc:      (L) Tightness= [] WNL   [] Min   [] Mod   [] Severe    (R) Tightness= [] WNL   [] Min   [] Mod   [] Severe  Other:    Palpation:   Neg/Pos  Neg/Pos  Neg/Pos   Joint Line  Quad tendon  Patellar ligament    Patella  Fibular head  Pes Anserinus    Tibial tubercle  Hamstring tendons  Infrapatellar fat pad      Optional Tests:  Patellar Positioning (Static)   []L []R Normal []L []R Lateral   []L []R Demetra Ephraim      []L []R Medial   []L []R B[place]    Patellar Tracking   []L []R Glide (Lat)   []L []R Tilt (Lat)     []L []R Glide (Med)  []L []R Tilt (Med)      []L []R Tile (Inf)     Patellar Mobility   []L []R Hypermobile []L []R Hypomobile         Girth Measurements:     Cm at  Cm above joint line   Cm at   Cm below joint line  Cm at joint line   Left        Right           Lachmans  [] Neg    [] Pos Posterior Drawer [] Neg    [x] Pos  Pivot Shift  [] Neg    [] Pos Posterior Sag  [] Neg    [x] Pos  FELICITAS   [] Neg    [] Pos Genie's Test [] Neg    [] Pos  ALRI   [] Neg    [] Pos Squat   [] Neg    [] Pos  Valgus@ 0 Degrees [] Neg    [] Pos Julia-Isaac [] Neg    [] Pos  Valgus@ 30 Degrees [] Neg    [] Pos Patellar Apprehension [] Neg    [] Pos  Varus@ 0 Degrees [] Neg    [] Pos Fagan's Compression [] Neg    [] Pos  Varus@ 30 Degrees [] Neg    [] Pos Ely's Test  [] Neg    [] Pos  Apley's Compression [] Neg    [] Pos Trisha's Test  [] Neg    [] Pos  Apley's Distraction [] Neg    [] Pos Stroke Test  [] Neg    [] Pos   Anterior Drawer [] Neg    [] Pos Fluctuation Test [] Neg    [] Pos  Other:                  [] Neg    [] Pos                 Other tests/comments:  Hip IR :30*, 22* ER left  Right: IR 30*, 32* ER     Pain Level (0-10 scale) post treatment: 0    ASSESSMENT/Changes in Function: 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  Pt was attending PT with making good progress towards goals with recently having f/u with surgeon with plans to continue with conservative management. Pt continues to c/o knee pain with squatting past 90*, running, cutting and jumping. Pt demonstrates WFL knee ROM without pain, decreased bilateral hip IR/ER ROM, decreased glut and HS strength, poor squat form with increased lumbar paraspinal use and weight shift to right, and positive Lachmans and posterior sag sign bilaterally with right > left. Pt would benefit from skilled PT services to address the above impairments to allow pt to return to playing football without pain. Patient will continue to benefit from skilled PT services to modify and progress therapeutic interventions, address functional mobility deficits, address ROM deficits, address strength deficits, analyze and cue movement patterns, analyze and modify body mechanics/ergonomics, assess and modify postural abnormalities and instruct in home and community integration to attain remaining goals. []  See Plan of Care  []  See progress note/recertification  []  See Discharge Summary         Progress towards goals / Updated goals:  Short Term Goals: STG- To be accomplished in 4 week(s):  1. Pt will be independent with HEP to encourage prophylaxis. Eval: HEP dispensed    2. Pt will be able to complete SL squat to parallel without pain and good form with in preparation for return to running  Eval: weight shift to right with DL, increased lumbar paraspinal use    Long Term Goals: LTG- To be accomplished in 8 week(s):  1. Pt will demonstrate ability to participate in football practices without pain to return to prior level of function  Eval: 8/10 pain with running, cutting, jumping       2. Pt bilateral hip IR and ER ROM  [facility] pt to squat with less pain. Eval:left  IR :30*, 22* ER    Right: IR 30*, 32* ER     3. Pt FOTO score will increase by >/=15 points to show improvement in subjective function.   Eval:60  Current: will address at visit 5        PLAN  [] Upgrade activities as tolerated     [x]  Continue plan of care  []  Update interventions per flow sheet       []  Discharge due to:_  []  Other:_      Volodymyr Conception 10/8/2019  2:11 PM